# Patient Record
Sex: FEMALE | Race: WHITE | Employment: FULL TIME | URBAN - METROPOLITAN AREA
[De-identification: names, ages, dates, MRNs, and addresses within clinical notes are randomized per-mention and may not be internally consistent; named-entity substitution may affect disease eponyms.]

---

## 2017-01-19 DIAGNOSIS — Z98.84 S/P LAPAROSCOPIC SLEEVE GASTRECTOMY: ICD-10-CM

## 2017-01-19 RX ORDER — OMEPRAZOLE 40 MG/1
CAPSULE, DELAYED RELEASE ORAL
Qty: 30 CAPSULE | Refills: 0 | Status: SHIPPED | OUTPATIENT
Start: 2017-01-19 | End: 2017-02-20

## 2017-02-03 ENCOUNTER — OFFICE VISIT (OUTPATIENT)
Dept: BARIATRICS/WEIGHT MGMT | Age: 37
End: 2017-02-03

## 2017-02-03 VITALS
SYSTOLIC BLOOD PRESSURE: 110 MMHG | WEIGHT: 166.5 LBS | DIASTOLIC BLOOD PRESSURE: 62 MMHG | HEIGHT: 63 IN | BODY MASS INDEX: 29.5 KG/M2 | HEART RATE: 76 BPM

## 2017-02-03 DIAGNOSIS — Z98.84 S/P LAPAROSCOPIC SLEEVE GASTRECTOMY: ICD-10-CM

## 2017-02-03 DIAGNOSIS — E66.3 OVERWEIGHT (BMI 25.0-29.9): Primary | ICD-10-CM

## 2017-02-03 RX ORDER — FERROUS SULFATE 325(65) MG
28 TABLET ORAL
COMMUNITY

## 2017-02-03 RX ORDER — CETIRIZINE HYDROCHLORIDE 10 MG/1
10 TABLET ORAL 4 TIMES DAILY PRN
COMMUNITY

## 2017-02-03 ASSESSMENT — ENCOUNTER SYMPTOMS
GASTROINTESTINAL NEGATIVE: 1
RESPIRATORY NEGATIVE: 1
EYES NEGATIVE: 1

## 2017-02-20 RX ORDER — OMEPRAZOLE 40 MG/1
CAPSULE, DELAYED RELEASE ORAL
Qty: 30 CAPSULE | Refills: 0 | Status: SHIPPED | OUTPATIENT
Start: 2017-02-20 | End: 2017-03-16

## 2017-02-27 ENCOUNTER — TELEPHONE (OUTPATIENT)
Dept: BARIATRICS/WEIGHT MGMT | Age: 37
End: 2017-02-27

## 2017-03-16 DIAGNOSIS — Z98.84 S/P LAPAROSCOPIC SLEEVE GASTRECTOMY: ICD-10-CM

## 2017-03-16 RX ORDER — OMEPRAZOLE 40 MG/1
CAPSULE, DELAYED RELEASE ORAL
Qty: 30 CAPSULE | Refills: 0 | Status: SHIPPED | OUTPATIENT
Start: 2017-03-16 | End: 2017-05-15

## 2017-04-17 ENCOUNTER — TELEPHONE (OUTPATIENT)
Dept: BARIATRICS/WEIGHT MGMT | Age: 37
End: 2017-04-17

## 2017-05-14 DIAGNOSIS — Z98.84 S/P LAPAROSCOPIC SLEEVE GASTRECTOMY: ICD-10-CM

## 2017-05-15 RX ORDER — OMEPRAZOLE 40 MG/1
CAPSULE, DELAYED RELEASE ORAL
Qty: 30 CAPSULE | Refills: 0 | Status: SHIPPED | OUTPATIENT
Start: 2017-05-15 | End: 2017-06-10 | Stop reason: SDUPTHER

## 2017-05-16 ENCOUNTER — OFFICE VISIT (OUTPATIENT)
Dept: BARIATRICS/WEIGHT MGMT | Age: 37
End: 2017-05-16

## 2017-05-16 VITALS
HEIGHT: 63 IN | WEIGHT: 162.8 LBS | RESPIRATION RATE: 16 BRPM | SYSTOLIC BLOOD PRESSURE: 96 MMHG | BODY MASS INDEX: 28.84 KG/M2 | HEART RATE: 75 BPM | DIASTOLIC BLOOD PRESSURE: 61 MMHG

## 2017-05-16 DIAGNOSIS — E66.01 MORBID OBESITY WITH BMI OF 45.0-49.9, ADULT (HCC): Primary | ICD-10-CM

## 2017-05-16 DIAGNOSIS — Z98.84 S/P LAPAROSCOPIC SLEEVE GASTRECTOMY: ICD-10-CM

## 2017-05-16 PROCEDURE — 99213 OFFICE O/P EST LOW 20 MIN: CPT | Performed by: SURGERY

## 2017-06-10 DIAGNOSIS — Z98.84 S/P LAPAROSCOPIC SLEEVE GASTRECTOMY: ICD-10-CM

## 2017-06-12 RX ORDER — OMEPRAZOLE 40 MG/1
CAPSULE, DELAYED RELEASE ORAL
Qty: 30 CAPSULE | Refills: 0 | Status: SHIPPED | OUTPATIENT
Start: 2017-06-12 | End: 2017-07-06 | Stop reason: SDUPTHER

## 2017-07-06 DIAGNOSIS — Z98.84 S/P LAPAROSCOPIC SLEEVE GASTRECTOMY: ICD-10-CM

## 2017-07-06 RX ORDER — OMEPRAZOLE 40 MG/1
CAPSULE, DELAYED RELEASE ORAL
Qty: 30 CAPSULE | Refills: 0 | Status: SHIPPED | OUTPATIENT
Start: 2017-07-06 | End: 2017-08-06 | Stop reason: SDUPTHER

## 2017-08-06 DIAGNOSIS — Z98.84 S/P LAPAROSCOPIC SLEEVE GASTRECTOMY: ICD-10-CM

## 2017-08-07 RX ORDER — OMEPRAZOLE 40 MG/1
CAPSULE, DELAYED RELEASE ORAL
Qty: 30 CAPSULE | Refills: 0 | Status: SHIPPED | OUTPATIENT
Start: 2017-08-07 | End: 2017-09-04 | Stop reason: SDUPTHER

## 2017-11-13 ENCOUNTER — TELEPHONE (OUTPATIENT)
Dept: BARIATRICS/WEIGHT MGMT | Age: 37
End: 2017-11-13

## 2017-11-13 DIAGNOSIS — K21.9 CHRONIC GERD: ICD-10-CM

## 2017-11-13 RX ORDER — OMEPRAZOLE 40 MG/1
40 CAPSULE, DELAYED RELEASE ORAL DAILY
Qty: 30 CAPSULE | Refills: 3 | Status: SHIPPED | OUTPATIENT
Start: 2017-11-13 | End: 2019-10-03 | Stop reason: SDUPTHER

## 2017-11-13 NOTE — TELEPHONE ENCOUNTER
Please let patient know that I sent a prescription into the pharmacy listed in her chart. Thank you.

## 2018-09-07 LAB
CHOLESTEROL, TOTAL: 177 MG/DL
CHOLESTEROL/HDL RATIO: NORMAL
HDLC SERPL-MCNC: 58 MG/DL (ref 35–70)
LDL CHOLESTEROL CALCULATED: 94 MG/DL (ref 0–160)
TRIGL SERPL-MCNC: 125 MG/DL
VLDLC SERPL CALC-MCNC: 25 MG/DL

## 2018-09-21 ENCOUNTER — TELEPHONE (OUTPATIENT)
Dept: BARIATRICS/WEIGHT MGMT | Age: 38
End: 2018-09-21

## 2018-09-21 NOTE — LETTER
Jessica Albarran MD  [facility]) [employer]  [address], [address] [facility], [address] [facility]  [phone]  Phone  [phone]  Fax      Javier Santamaria,    We noticed that you missed your last appointment. We are interested in your progress and how you have been feeling! As you know, it is important to complete regular follow-up visits to monitor your health after surgery and to insure the best success of your procedure. Please call the office today at [phone] to schedule an appointment. We look forward to seeing you!     Jessica Albarran MD  [facility] ([facility]) [facility]  [address], [address] [facility], [address] [facility]  [phone]  Phone  [phone]  Fax

## 2019-02-04 LAB — TSH SERPL DL<=0.05 MIU/L-ACNC: 2.31 UIU/ML

## 2019-02-08 ENCOUNTER — HOSPITAL ENCOUNTER (OUTPATIENT)
Dept: ULTRASOUND IMAGING | Age: 39
Discharge: HOME | End: 2019-02-08
Payer: COMMERCIAL

## 2019-02-08 DIAGNOSIS — R22.1 NECK MASS: ICD-10-CM

## 2019-02-08 PROCEDURE — 76536 US EXAM OF HEAD AND NECK: CPT

## 2019-05-20 ENCOUNTER — TELEPHONE (OUTPATIENT)
Dept: FAMILY MEDICINE CLINIC | Age: 39
End: 2019-05-20

## 2019-05-20 NOTE — TELEPHONE ENCOUNTER
SPK W/ PT ADVISED SHE IS ON INJECTIONS FROM ALLERGIST NOW AND SHE HAS HAD NO HIVES. SHE WILL F/U WITH ALLERGIST AND GO FROM THERE. ADVISED JESSIKA ORNELAS AND SHE VOICED UNDERSTANDING.   Electronically signed by Raymond Mccall MA on 5/20/2019 at 3:15 PM

## 2019-05-20 NOTE — TELEPHONE ENCOUNTER
Got note from Dr. Bentley Apley (allergist), he recommended follow up to discuss referral to Rheumatology since nothing came back in his allergy tests. Can schedule with me or Dr. Meenu Driver.

## 2019-06-12 ENCOUNTER — TELEPHONE (OUTPATIENT)
Dept: FAMILY MEDICINE CLINIC | Age: 39
End: 2019-06-12

## 2019-06-20 ENCOUNTER — TELEPHONE (OUTPATIENT)
Dept: FAMILY MEDICINE CLINIC | Age: 39
End: 2019-06-20

## 2019-06-20 NOTE — TELEPHONE ENCOUNTER
----- Message from Titi Chambers sent at 6/20/2019  4:28 PM EDT -----  CALLED LAST WEEK TO GET HER IMMUNIZ[facility] RECORD FOR [facility]  THX

## 2019-06-25 ENCOUNTER — OFFICE VISIT (OUTPATIENT)
Dept: FAMILY MEDICINE CLINIC | Age: 39
End: 2019-06-25
Payer: COMMERCIAL

## 2019-06-25 VITALS
HEIGHT: 63 IN | DIASTOLIC BLOOD PRESSURE: 80 MMHG | SYSTOLIC BLOOD PRESSURE: 116 MMHG | WEIGHT: 211.4 LBS | BODY MASS INDEX: 37.46 KG/M2

## 2019-06-25 DIAGNOSIS — R39.9 UTI SYMPTOMS: Primary | ICD-10-CM

## 2019-06-25 LAB
BILIRUBIN, POC: ABNORMAL
BLOOD URINE, POC: ABNORMAL
CLARITY, POC: ABNORMAL
COLOR, POC: ABNORMAL
GLUCOSE URINE, POC: 100
KETONES, POC: 15
LEUKOCYTE EST, POC: ABNORMAL
NITRITE, POC: POSITIVE
PH, POC: 7.5
PROTEIN, POC: >300
SPECIFIC GRAVITY, POC: 1.01
UROBILINOGEN, POC: 1

## 2019-06-25 PROCEDURE — 99213 OFFICE O/P EST LOW 20 MIN: CPT | Performed by: NURSE PRACTITIONER

## 2019-06-25 PROCEDURE — 81002 URINALYSIS NONAUTO W/O SCOPE: CPT | Performed by: NURSE PRACTITIONER

## 2019-06-25 PROCEDURE — G8417 CALC BMI ABV UP PARAM F/U: HCPCS | Performed by: NURSE PRACTITIONER

## 2019-06-25 PROCEDURE — G8427 DOCREV CUR MEDS BY ELIG CLIN: HCPCS | Performed by: NURSE PRACTITIONER

## 2019-06-25 PROCEDURE — 1036F TOBACCO NON-USER: CPT | Performed by: NURSE PRACTITIONER

## 2019-06-25 RX ORDER — PHENAZOPYRIDINE HYDROCHLORIDE 200 MG/1
200 TABLET, FILM COATED ORAL 3 TIMES DAILY PRN
Qty: 6 TABLET | Refills: 0 | Status: SHIPPED | OUTPATIENT
Start: 2019-06-25 | End: 2019-06-28

## 2019-06-25 RX ORDER — SULFAMETHOXAZOLE AND TRIMETHOPRIM 800; 160 MG/1; MG/1
1 TABLET ORAL 2 TIMES DAILY
Qty: 6 TABLET | Refills: 0 | Status: SHIPPED | OUTPATIENT
Start: 2019-06-25 | End: 2019-06-28

## 2019-06-25 RX ORDER — PHENAZOPYRIDINE HYDROCHLORIDE 100 MG/1
100 TABLET, FILM COATED ORAL 3 TIMES DAILY PRN
COMMUNITY
End: 2019-06-25 | Stop reason: CLARIF

## 2019-06-25 NOTE — PROGRESS NOTES
Quan Nate  1980  41 y.o. SUBJECT JOSEY:    Chief Complaint   Patient presents with   Corinne Generous is a 41year old female who is in with UTI symptoms for the last week. She states she was treating self with OTC Azo and cystex (has some antibiotic effect). She states the treatments have not helped. She states the pain in her abdomen was so bad she doubled up on the Azo recommended treatment. She denies back pain, nausea, vomiting or seeing blood in her urine. She denies fevers, chills or sweats. Dysuria    This is a new problem. The current episode started in the past 7 days. The problem occurs every urination. The problem has been unchanged. The quality of the pain is described as aching. She is sexually active. Associated symptoms include frequency and urgency. Pertinent negatives include no chills, flank pain, hematuria or nausea. Current Outpatient Medications on File Prior to Visit   Medication Sig Dispense Refill    Methenamine-Sodium Salicylate (CYSTEX PO) Take by mouth      omeprazole (PRILOSEC) 40 MG delayed release capsule Take 1 capsule by mouth daily 30 capsule 3    cetirizine (ZYRTEC) 10 MG tablet Take 10 mg by mouth daily      ferrous sulfate 325 (65 FE) MG tablet Take 28 mg by mouth daily (with breakfast)      Calcium Citrate-Vitamin D (CALCIUM CITRATE+D3 PETITES) 200-250 MG-UNIT TABS Take 2 tablets by mouth daily      Multiple Vitamin (MULTI VITAMIN DAILY PO) Take 2 tablets by mouth daily      BIOTIN PO Take 1 tablet by mouth daily      fluticasone (FLONASE) 50 MCG/ACT nasal spray 1 spray by Nasal route 2 times daily      levothyroxine (SYNTHROID) 75 MCG tablet        No current facility-administered medications on file prior to visit.         Past Medical History:   Diagnosis Date    Gout     Hypothyroid     Morbid obesity with BMI of 45.0-49.9, adult [facility])      Past Surgical History:   Procedure Laterality Date    SLEEVE GASTRECTOMY  4/25/16    LAPAROSCOPIC SLEEVE GASTRECTOMY - ETHICON     Family History   Problem Relation Age of Onset    High Blood Pressure Father     High Cholesterol Father     Coronary Art Dis Father     Diabetes Father     Arthritis Father     Depression Mother     Depression Brother     Diabetes Paternal Uncle     Cancer Maternal Grandfather     Colon Cancer Neg Hx      Social History     Socioeconomic History    Marital status: Single     Spouse name: Not on file    Number of children: Not on file    Years of education: Not on file    Highest education level: Not on file   Occupational History    Not on file   Social Needs    Financial resource strain: Not on file    Food insecurity:     Worry: Not on file     Inability: Not on file    Transportation needs:     Medical: Not on file     Non-medical: Not on file   Tobacco Use    Smoking status: Never Smoker    Smokeless tobacco: Never Used   Substance and Sexual Activity    Alcohol use: No    Drug use: No    Sexual activity: Never   Lifestyle    Physical activity:     Days per week: Not on file     Minutes per session: Not on file    Stress: Not on file   Relationships    Social connections:     Talks on phone: Not on file     Gets together: Not on file     Attends [employer] service: Not on file     Active member of club or organization: Not on file     Attends meetings of clubs or organizations: Not on file     Relationship status: Not on file    Intimate partner violence:     Fear of current or ex partner: Not on file     Emotionally abused: Not on file     Physically abused: Not on file     Forced sexual activity: Not on file   Other Topics Concern    Not on file   Social History Narrative    Not on file       Review of Systems   Constitutional: Negative for activity change, appetite change, chills, diaphoresis, fatigue, fever and unexpected weight change. Respiratory: Negative for cough, chest tightness, shortness of breath and wheezing.     Cardiovascular: Negative for chest pain and palpitations. Gastrointestinal: Negative for nausea. Genitourinary: Positive for dysuria, frequency and urgency. Negative for decreased urine volume, difficulty urinating, flank pain and hematuria. Musculoskeletal: Negative for back pain. OBJECTIVE:     /80 (Site: Right Upper Arm, Position: Sitting, Cuff Size: Medium Adult)   Ht 5' 3\" (1.6 m)   Wt 211 lb 6.4 oz (95.9 kg)   BMI 37.45 kg/m²     Physical Exam   Constitutional: She is oriented to person, place, and time. She appears well-developed and well-nourished. No distress. HENT:   Head: Normocephalic and atraumatic. Eyes: Pupils are equal, round, and reactive to light. Conjunctivae are normal.   Neck: Normal range of motion. Neck supple. Cardiovascular: Normal rate, regular rhythm and normal heart sounds. Pulmonary/Chest: Effort normal and breath sounds normal.   Abdominal: Soft. Bowel sounds are normal. She exhibits no distension. There is tenderness in the suprapubic area. There is CVA tenderness. Musculoskeletal: Normal range of motion. Neurological: She is alert and oriented to person, place, and time. Skin: Skin is warm and dry. She is not diaphoretic. Psychiatric: She has a normal mood and affect. Her behavior is normal. Judgment and thought content normal.   Vitals reviewed. No results found for requested labs within last 30 days.      No results found for: Dewaine Rater, LDLCALC    Lab Results   Component Value Date    WBC 13.8 04/26/2016    WBC 8.4 03/15/2011    NEUTROABS 11.6 04/26/2016    HGB 13.1 04/26/2016    HGB 13.6 03/15/2011    HCT 39.9 04/26/2016    HCT 41.0 03/15/2011    MCV 89.5 04/26/2016    MCV 91.5 03/15/2011     04/26/2016     03/15/2011    LYMPHSABS 1.6 04/26/2016    MONOSABS 0.6 04/26/2016    EOSABS 0.0 04/26/2016    BASOSABS 0.0 04/26/2016     Lab Results   Component Value Date    TSHHS 3.413 03/15/2011     No results found for: LABALBU, BILITOT, BILIDIR, IBILI, AST, ALT, ALKPHOS          Results for orders placed or performed in visit on 06/25/19   POCT Urinalysis no Micro   Result Value Ref Range    Color, UA Orange     Clarity, UA Cloudy     Glucose, UA      Bilirubin, UA small     Ketones, UA 15     Spec Grav, UA 1.015     Blood, UA POC small     pH, UA 7.5     Protein, UA POC >300     Urobilinogen, UA 1.0     Leukocytes, UA small     Nitrite, UA positive        ASSESSMENT AND PLAN:     1. UTI symptoms  - POCT Urinalysis no Micro  - URINE CULTURE  - sulfamethoxazole-trimethoprim (BACTRIM DS;SEPTRA DS) 800-160 MG per tablet; Take 1 tablet by mouth 2 times daily for 3 days  Dispense: 6 tablet; Refill: 0  - phenazopyridine (PYRIDIUM) 200 MG tablet; Take 1 tablet by mouth 3 times daily as needed for Pain  Dispense: 6 tablet; Refill: 0    Increase fluids, rest  Take prescribed medications as directed  Will send urine for culture  Will call results to you  Return to clinic with any problems or contact your PCP  Verbalized understanding and agreement with plan    No follow-ups on file. Care discussed with patient. Patient educated on signs and symptoms of exacerbation and when to seek further medical attention. Advised to call for any problems, questions, or concerns. Patient verbalizes understanding and agrees with plan. Medications reviewed and reconciled. Continue current medications. Appropriate prescriptions are ordered. Risks and benefits of meds are discussed. After visit summary provided.

## 2019-06-26 ASSESSMENT — ENCOUNTER SYMPTOMS
CHEST TIGHTNESS: 0
SHORTNESS OF BREATH: 0
WHEEZING: 0
BACK PAIN: 0
NAUSEA: 0
COUGH: 0

## 2019-06-26 NOTE — PATIENT INSTRUCTIONS
Increase fluids, rest  Take prescribed medications as directed  Will send urine for culture  Will call results to you  Return to clinic with any problems or contact your PCP  Verbalized understanding and agreement with plan

## 2019-06-27 ENCOUNTER — TELEPHONE (OUTPATIENT)
Dept: FAMILY MEDICINE CLINIC | Age: 39
End: 2019-06-27

## 2019-06-27 LAB
ORGANISM: ABNORMAL
URINE CULTURE, ROUTINE: ABNORMAL
URINE CULTURE, ROUTINE: ABNORMAL

## 2019-06-27 NOTE — TELEPHONE ENCOUNTER
Notified Pt of Urine  results. Verbalized understanding. Pt feeling much better     ----- Message from ELIEL Jolly CNP sent at 6/27/2019  5:01 PM EDT -----  Please let patient know results of her urine culture showed a bacterial infection. The Bactrim is a drug that should be helping. Please let the patient know to call back if the medication is not helping. Thanks.   Mily Jaimes

## 2019-08-16 ENCOUNTER — NURSE ONLY (OUTPATIENT)
Dept: FAMILY MEDICINE CLINIC | Age: 39
End: 2019-08-16

## 2019-08-16 DIAGNOSIS — Z01.84 IMMUNITY TO VARICELLA DETERMINED BY SEROLOGIC TEST: Primary | ICD-10-CM

## 2019-08-16 NOTE — PROGRESS NOTES
Patient presented to office today for a varicella-zoster titer. One SST was obtained with a butterfly. Patient tolerated with out complaint.

## 2019-08-19 LAB — VZV IGG SER QL IA: 902 IV

## 2019-08-27 ENCOUNTER — OFFICE VISIT (OUTPATIENT)
Dept: FAMILY MEDICINE CLINIC | Age: 39
End: 2019-08-27
Payer: COMMERCIAL

## 2019-08-27 DIAGNOSIS — Z11.1 TUBERCULOSIS SCREENING: Primary | ICD-10-CM

## 2019-08-27 PROCEDURE — 86580 TB INTRADERMAL TEST: CPT | Performed by: NURSE PRACTITIONER

## 2019-10-03 ENCOUNTER — OFFICE VISIT (OUTPATIENT)
Dept: FAMILY MEDICINE CLINIC | Age: 39
End: 2019-10-03

## 2019-10-03 ENCOUNTER — TELEPHONE (OUTPATIENT)
Dept: BARIATRICS/WEIGHT MGMT | Age: 39
End: 2019-10-03

## 2019-10-03 VITALS
WEIGHT: 216.2 LBS | DIASTOLIC BLOOD PRESSURE: 82 MMHG | SYSTOLIC BLOOD PRESSURE: 130 MMHG | OXYGEN SATURATION: 97 % | BODY MASS INDEX: 38.3 KG/M2 | HEART RATE: 87 BPM

## 2019-10-03 DIAGNOSIS — Z82.49: Primary | ICD-10-CM

## 2019-10-03 DIAGNOSIS — R07.9 CHEST PAIN, UNSPECIFIED TYPE: ICD-10-CM

## 2019-10-03 DIAGNOSIS — K21.9 CHRONIC GERD: ICD-10-CM

## 2019-10-03 PROCEDURE — G8417 CALC BMI ABV UP PARAM F/U: HCPCS | Performed by: NURSE PRACTITIONER

## 2019-10-03 PROCEDURE — 93000 ELECTROCARDIOGRAM COMPLETE: CPT | Performed by: NURSE PRACTITIONER

## 2019-10-03 PROCEDURE — G8427 DOCREV CUR MEDS BY ELIG CLIN: HCPCS | Performed by: NURSE PRACTITIONER

## 2019-10-03 PROCEDURE — 1036F TOBACCO NON-USER: CPT | Performed by: NURSE PRACTITIONER

## 2019-10-03 PROCEDURE — G8484 FLU IMMUNIZE NO ADMIN: HCPCS | Performed by: NURSE PRACTITIONER

## 2019-10-03 PROCEDURE — 99213 OFFICE O/P EST LOW 20 MIN: CPT | Performed by: NURSE PRACTITIONER

## 2019-10-03 RX ORDER — OMEPRAZOLE 40 MG/1
40 CAPSULE, DELAYED RELEASE ORAL DAILY
Qty: 30 CAPSULE | Refills: 2 | Status: SHIPPED | OUTPATIENT
Start: 2019-10-03 | End: 2019-10-10 | Stop reason: SDUPTHER

## 2019-10-03 RX ORDER — ATORVASTATIN CALCIUM 40 MG/1
TABLET, FILM COATED ORAL
Refills: 1 | COMMUNITY
Start: 2019-07-23 | End: 2019-10-10 | Stop reason: SDUPTHER

## 2019-10-03 RX ORDER — OMALIZUMAB 150 MG/ML
INJECTION, SOLUTION SUBCUTANEOUS
COMMUNITY
Start: 2019-09-13 | End: 2021-10-19

## 2019-10-03 ASSESSMENT — ENCOUNTER SYMPTOMS
SORE THROAT: 1
WHEEZING: 0
NAUSEA: 0
SINUS PAIN: 0
TROUBLE SWALLOWING: 0
SHORTNESS OF BREATH: 0
CHEST TIGHTNESS: 0
SINUS PRESSURE: 0
BACK PAIN: 1
COUGH: 0
ABDOMINAL PAIN: 0

## 2019-10-03 ASSESSMENT — PATIENT HEALTH QUESTIONNAIRE - PHQ9
1. LITTLE INTEREST OR PLEASURE IN DOING THINGS: 0
SUM OF ALL RESPONSES TO PHQ QUESTIONS 1-9: 0
SUM OF ALL RESPONSES TO PHQ9 QUESTIONS 1 & 2: 0
SUM OF ALL RESPONSES TO PHQ QUESTIONS 1-9: 0
2. FEELING DOWN, DEPRESSED OR HOPELESS: 0

## 2019-10-07 DIAGNOSIS — F32.A DEPRESSIVE DISORDER: ICD-10-CM

## 2019-10-07 RX ORDER — NORGESTIMATE AND ETHINYL ESTRADIOL 0.25-0.035
1 KIT ORAL DAILY
COMMUNITY
End: 2019-10-10 | Stop reason: SDUPTHER

## 2019-10-10 ENCOUNTER — OFFICE VISIT (OUTPATIENT)
Dept: FAMILY MEDICINE CLINIC | Age: 39
End: 2019-10-10

## 2019-10-10 VITALS
WEIGHT: 216.8 LBS | BODY MASS INDEX: 38.4 KG/M2 | HEART RATE: 88 BPM | OXYGEN SATURATION: 98 % | DIASTOLIC BLOOD PRESSURE: 86 MMHG | SYSTOLIC BLOOD PRESSURE: 124 MMHG

## 2019-10-10 DIAGNOSIS — E03.8 OTHER SPECIFIED HYPOTHYROIDISM: ICD-10-CM

## 2019-10-10 DIAGNOSIS — Z76.0 MEDICATION REFILL: ICD-10-CM

## 2019-10-10 DIAGNOSIS — E78.5 HYPERLIPIDEMIA, UNSPECIFIED HYPERLIPIDEMIA TYPE: Primary | ICD-10-CM

## 2019-10-10 DIAGNOSIS — K21.9 CHRONIC GERD: ICD-10-CM

## 2019-10-10 DIAGNOSIS — T75.3XXA MOTION SICKNESS, INITIAL ENCOUNTER: ICD-10-CM

## 2019-10-10 DIAGNOSIS — Z76.89 ENCOUNTER TO ESTABLISH CARE: ICD-10-CM

## 2019-10-10 DIAGNOSIS — N92.6 MENSTRUAL CYCLE DISORDER: ICD-10-CM

## 2019-10-10 PROCEDURE — G8427 DOCREV CUR MEDS BY ELIG CLIN: HCPCS | Performed by: NURSE PRACTITIONER

## 2019-10-10 PROCEDURE — G8417 CALC BMI ABV UP PARAM F/U: HCPCS | Performed by: NURSE PRACTITIONER

## 2019-10-10 PROCEDURE — 99214 OFFICE O/P EST MOD 30 MIN: CPT | Performed by: NURSE PRACTITIONER

## 2019-10-10 PROCEDURE — G8484 FLU IMMUNIZE NO ADMIN: HCPCS | Performed by: NURSE PRACTITIONER

## 2019-10-10 PROCEDURE — 1036F TOBACCO NON-USER: CPT | Performed by: NURSE PRACTITIONER

## 2019-10-10 RX ORDER — SCOLOPAMINE TRANSDERMAL SYSTEM 1 MG/1
1 PATCH, EXTENDED RELEASE TRANSDERMAL
Qty: 1 PATCH | Refills: 0 | Status: SHIPPED | OUTPATIENT
Start: 2019-10-10 | End: 2022-05-23 | Stop reason: SDUPTHER

## 2019-10-10 RX ORDER — NORGESTIMATE AND ETHINYL ESTRADIOL 0.25-0.035
1 KIT ORAL DAILY
Qty: 3 PACKET | Refills: 3 | Status: SHIPPED | OUTPATIENT
Start: 2019-10-10

## 2019-10-10 RX ORDER — ATORVASTATIN CALCIUM 40 MG/1
TABLET, FILM COATED ORAL
Qty: 90 TABLET | Refills: 1 | Status: SHIPPED | OUTPATIENT
Start: 2019-10-10 | End: 2020-05-28 | Stop reason: SDUPTHER

## 2019-10-10 RX ORDER — OMEPRAZOLE 40 MG/1
40 CAPSULE, DELAYED RELEASE ORAL DAILY
Qty: 90 CAPSULE | Refills: 1 | Status: SHIPPED | OUTPATIENT
Start: 2019-10-10 | End: 2020-04-10

## 2019-10-10 RX ORDER — LEVOTHYROXINE SODIUM 0.07 MG/1
75 TABLET ORAL DAILY
Qty: 90 TABLET | Refills: 1 | Status: SHIPPED | OUTPATIENT
Start: 2019-10-10 | End: 2020-04-10

## 2019-10-10 ASSESSMENT — ENCOUNTER SYMPTOMS
CHEST TIGHTNESS: 0
SORE THROAT: 0
COUGH: 0
VOMITING: 0
SHORTNESS OF BREATH: 0
TROUBLE SWALLOWING: 0
CONSTIPATION: 0
ABDOMINAL PAIN: 0
DIARRHEA: 0
NAUSEA: 0
WHEEZING: 0

## 2019-10-14 ENCOUNTER — OFFICE VISIT (OUTPATIENT)
Dept: FAMILY MEDICINE CLINIC | Age: 39
End: 2019-10-14

## 2019-10-14 VITALS
DIASTOLIC BLOOD PRESSURE: 78 MMHG | BODY MASS INDEX: 38.88 KG/M2 | WEIGHT: 219.4 LBS | HEART RATE: 83 BPM | SYSTOLIC BLOOD PRESSURE: 124 MMHG | HEIGHT: 63 IN | TEMPERATURE: 97.9 F

## 2019-10-14 DIAGNOSIS — R39.9 UTI SYMPTOMS: Primary | ICD-10-CM

## 2019-10-14 LAB
BILIRUBIN, POC: NORMAL
BLOOD URINE, POC: NORMAL
CLARITY, POC: CLEAR
COLOR, POC: YELLOW
GLUCOSE URINE, POC: NORMAL
KETONES, POC: NORMAL
LEUKOCYTE EST, POC: NORMAL
NITRITE, POC: NORMAL
PH, POC: 7
PROTEIN, POC: NORMAL
SPECIFIC GRAVITY, POC: 1.02
UROBILINOGEN, POC: 0.2

## 2019-10-14 PROCEDURE — 1036F TOBACCO NON-USER: CPT | Performed by: NURSE PRACTITIONER

## 2019-10-14 PROCEDURE — G8484 FLU IMMUNIZE NO ADMIN: HCPCS | Performed by: NURSE PRACTITIONER

## 2019-10-14 PROCEDURE — 81002 URINALYSIS NONAUTO W/O SCOPE: CPT | Performed by: NURSE PRACTITIONER

## 2019-10-14 PROCEDURE — G8427 DOCREV CUR MEDS BY ELIG CLIN: HCPCS | Performed by: NURSE PRACTITIONER

## 2019-10-14 PROCEDURE — 99213 OFFICE O/P EST LOW 20 MIN: CPT | Performed by: NURSE PRACTITIONER

## 2019-10-14 PROCEDURE — G8417 CALC BMI ABV UP PARAM F/U: HCPCS | Performed by: NURSE PRACTITIONER

## 2019-10-14 RX ORDER — NITROFURANTOIN 25; 75 MG/1; MG/1
100 CAPSULE ORAL 2 TIMES DAILY
Qty: 14 CAPSULE | Refills: 0 | Status: SHIPPED | OUTPATIENT
Start: 2019-10-14 | End: 2019-10-21

## 2019-10-14 ASSESSMENT — ENCOUNTER SYMPTOMS
COUGH: 0
ABDOMINAL PAIN: 0
SHORTNESS OF BREATH: 0
NAUSEA: 0
WHEEZING: 0
CHEST TIGHTNESS: 0
BACK PAIN: 0

## 2019-10-16 LAB — URINE CULTURE, ROUTINE: NORMAL

## 2020-05-28 RX ORDER — ATORVASTATIN CALCIUM 40 MG/1
TABLET, FILM COATED ORAL
Qty: 90 TABLET | Refills: 0 | Status: SHIPPED | OUTPATIENT
Start: 2020-05-28 | End: 2020-08-13 | Stop reason: SDUPTHER

## 2020-07-27 ENCOUNTER — NURSE ONLY (OUTPATIENT)
Dept: FAMILY MEDICINE CLINIC | Age: 40
End: 2020-07-27
Payer: COMMERCIAL

## 2020-07-27 LAB
CHOLESTEROL, TOTAL: 186 MG/DL (ref 0–199)
HDLC SERPL-MCNC: 47 MG/DL (ref 40–60)
LDL CHOLESTEROL CALCULATED: 95 MG/DL
TRIGL SERPL-MCNC: 222 MG/DL (ref 0–150)
VLDLC SERPL CALC-MCNC: 44 MG/DL

## 2020-07-27 PROCEDURE — 36415 COLL VENOUS BLD VENIPUNCTURE: CPT | Performed by: NURSE PRACTITIONER

## 2020-07-27 RX ORDER — ALBUTEROL SULFATE 2.5 MG/3ML
2.5 SOLUTION RESPIRATORY (INHALATION) EVERY 6 HOURS PRN
Qty: 25 EACH | Refills: 0 | Status: SHIPPED | OUTPATIENT
Start: 2020-07-27 | End: 2020-07-30 | Stop reason: SDUPTHER

## 2020-07-28 ENCOUNTER — TELEPHONE (OUTPATIENT)
Dept: FAMILY MEDICINE CLINIC | Age: 40
End: 2020-07-28

## 2020-07-28 DIAGNOSIS — E78.5 HYPERLIPIDEMIA, UNSPECIFIED HYPERLIPIDEMIA TYPE: ICD-10-CM

## 2020-07-28 LAB
A/G RATIO: 1.5 (ref 1.1–2.2)
ALBUMIN SERPL-MCNC: 4.1 G/DL (ref 3.4–5)
ALP BLD-CCNC: 56 U/L (ref 40–129)
ALT SERPL-CCNC: 12 U/L (ref 10–40)
ANION GAP SERPL CALCULATED.3IONS-SCNC: 20 MMOL/L (ref 3–16)
AST SERPL-CCNC: 16 U/L (ref 15–37)
BILIRUB SERPL-MCNC: <0.2 MG/DL (ref 0–1)
BUN BLDV-MCNC: 13 MG/DL (ref 7–20)
CALCIUM SERPL-MCNC: 9.4 MG/DL (ref 8.3–10.6)
CHLORIDE BLD-SCNC: 100 MMOL/L (ref 99–110)
CO2: 20 MMOL/L (ref 21–32)
CREAT SERPL-MCNC: 0.8 MG/DL (ref 0.6–1.1)
GFR AFRICAN AMERICAN: >60
GFR NON-AFRICAN AMERICAN: >60
GLOBULIN: 2.8 G/DL
GLUCOSE BLD-MCNC: 80 MG/DL (ref 70–99)
POTASSIUM SERPL-SCNC: 4 MMOL/L (ref 3.5–5.1)
SODIUM BLD-SCNC: 140 MMOL/L (ref 136–145)
TOTAL PROTEIN: 6.9 G/DL (ref 6.4–8.2)

## 2020-07-28 NOTE — TELEPHONE ENCOUNTER
[address] with the CMP result. Discussed possible reason for elevation in triglycerides - hormonal. She states her gyn increased the dosage of her hormone about 2 months ago to see if this would help with increase in hot flashes and other symptoms she was having. She states her lifestyle has not been the best with the pandemic. States she is making some changes. Will repeat lipids in 4 to 6 months. Verbalized understanding and agreement with plan.

## 2020-07-28 NOTE — TELEPHONE ENCOUNTER
Called patient with lab result. She states she watches her diet but has not been walking or being physically active. She states she has not missed any of her statin. Will add CMP to evaluate other values.  Verbalized understanding and agreement with plan

## 2020-07-30 RX ORDER — ALBUTEROL SULFATE 2.5 MG/3ML
2.5 SOLUTION RESPIRATORY (INHALATION) EVERY 6 HOURS PRN
Qty: 25 EACH | Refills: 0 | Status: SHIPPED | OUTPATIENT
Start: 2020-07-30 | End: 2020-08-03 | Stop reason: SDUPTHER

## 2020-08-03 RX ORDER — ALBUTEROL SULFATE 2.5 MG/3ML
2.5 SOLUTION RESPIRATORY (INHALATION) EVERY 6 HOURS PRN
Qty: 25 EACH | Refills: 0 | Status: SHIPPED | OUTPATIENT
Start: 2020-08-03

## 2020-08-07 ENCOUNTER — NURSE ONLY (OUTPATIENT)
Dept: FAMILY MEDICINE CLINIC | Age: 40
End: 2020-08-07

## 2020-08-11 LAB
QUANTI TB GOLD PLUS: NEGATIVE
QUANTI TB1 MINUS NIL: 0 IU/ML (ref 0–0.34)
QUANTI TB2 MINUS NIL: 0 IU/ML (ref 0–0.34)
QUANTIFERON MITOGEN: >10 IU/ML
QUANTIFERON NIL: 0.02 IU/ML

## 2020-08-13 RX ORDER — ATORVASTATIN CALCIUM 40 MG/1
TABLET, FILM COATED ORAL
Qty: 90 TABLET | Refills: 1 | Status: SHIPPED | OUTPATIENT
Start: 2020-08-13 | End: 2021-02-16

## 2020-10-05 RX ORDER — OMEPRAZOLE 40 MG/1
CAPSULE, DELAYED RELEASE ORAL
Qty: 90 CAPSULE | Refills: 3 | OUTPATIENT
Start: 2020-10-05

## 2020-10-05 RX ORDER — LEVOTHYROXINE SODIUM 0.07 MG/1
TABLET ORAL
Qty: 90 TABLET | Refills: 3 | OUTPATIENT
Start: 2020-10-05

## 2020-10-29 ENCOUNTER — NURSE ONLY (OUTPATIENT)
Dept: FAMILY MEDICINE CLINIC | Age: 40
End: 2020-10-29
Payer: COMMERCIAL

## 2020-10-29 PROCEDURE — 90471 IMMUNIZATION ADMIN: CPT | Performed by: NURSE PRACTITIONER

## 2020-10-29 PROCEDURE — 90686 IIV4 VACC NO PRSV 0.5 ML IM: CPT | Performed by: NURSE PRACTITIONER

## 2020-11-03 ENCOUNTER — NURSE ONLY (OUTPATIENT)
Dept: FAMILY MEDICINE CLINIC | Age: 40
End: 2020-11-03
Payer: COMMERCIAL

## 2020-11-03 LAB
BASOPHILS ABSOLUTE: 0 K/UL (ref 0–0.2)
BASOPHILS RELATIVE PERCENT: 0.3 %
CHOLESTEROL, TOTAL: 171 MG/DL (ref 0–199)
EOSINOPHILS ABSOLUTE: 0.1 K/UL (ref 0–0.6)
EOSINOPHILS RELATIVE PERCENT: 1.3 %
HCT VFR BLD CALC: 39.5 % (ref 36–48)
HDLC SERPL-MCNC: 44 MG/DL (ref 40–60)
HEMOGLOBIN: 13.2 G/DL (ref 12–16)
LDL CHOLESTEROL CALCULATED: 85 MG/DL
LYMPHOCYTES ABSOLUTE: 2.2 K/UL (ref 1–5.1)
LYMPHOCYTES RELATIVE PERCENT: 30.7 %
MCH RBC QN AUTO: 30.6 PG (ref 26–34)
MCHC RBC AUTO-ENTMCNC: 33.4 G/DL (ref 31–36)
MCV RBC AUTO: 91.8 FL (ref 80–100)
MONOCYTES ABSOLUTE: 0.6 K/UL (ref 0–1.3)
MONOCYTES RELATIVE PERCENT: 7.7 %
NEUTROPHILS ABSOLUTE: 4.4 K/UL (ref 1.7–7.7)
NEUTROPHILS RELATIVE PERCENT: 60 %
PDW BLD-RTO: 12.6 % (ref 12.4–15.4)
PLATELET # BLD: 405 K/UL (ref 135–450)
PMV BLD AUTO: 7.3 FL (ref 5–10.5)
RBC # BLD: 4.31 M/UL (ref 4–5.2)
T4 FREE: 1 NG/DL (ref 0.9–1.8)
TRIGL SERPL-MCNC: 212 MG/DL (ref 0–150)
TSH SERPL DL<=0.05 MIU/L-ACNC: 2.83 UIU/ML (ref 0.27–4.2)
VLDLC SERPL CALC-MCNC: 42 MG/DL
WBC # BLD: 7.3 K/UL (ref 4–11)

## 2020-11-03 PROCEDURE — 36415 COLL VENOUS BLD VENIPUNCTURE: CPT | Performed by: NURSE PRACTITIONER

## 2020-11-05 RX ORDER — OMEPRAZOLE 40 MG/1
CAPSULE, DELAYED RELEASE ORAL
Qty: 90 CAPSULE | Refills: 1 | Status: SHIPPED | OUTPATIENT
Start: 2020-11-05 | End: 2021-03-01

## 2021-01-08 DIAGNOSIS — J45.20 MILD INTERMITTENT ASTHMA WITHOUT COMPLICATION: ICD-10-CM

## 2021-01-08 RX ORDER — ALBUTEROL SULFATE 90 UG/1
2 AEROSOL, METERED RESPIRATORY (INHALATION) 4 TIMES DAILY PRN
Qty: 1 INHALER | Refills: 1 | Status: SHIPPED | OUTPATIENT
Start: 2021-01-08

## 2021-01-29 ENCOUNTER — VIRTUAL VISIT (OUTPATIENT)
Dept: FAMILY MEDICINE CLINIC | Age: 41
End: 2021-01-29
Payer: COMMERCIAL

## 2021-01-29 DIAGNOSIS — J02.9 ACUTE VIRAL PHARYNGITIS: Primary | ICD-10-CM

## 2021-01-29 DIAGNOSIS — Z20.818 EXPOSURE TO STREP THROAT: ICD-10-CM

## 2021-01-29 PROCEDURE — 1036F TOBACCO NON-USER: CPT | Performed by: NURSE PRACTITIONER

## 2021-01-29 PROCEDURE — 99213 OFFICE O/P EST LOW 20 MIN: CPT | Performed by: NURSE PRACTITIONER

## 2021-01-29 PROCEDURE — G8421 BMI NOT CALCULATED: HCPCS | Performed by: NURSE PRACTITIONER

## 2021-01-29 PROCEDURE — G8482 FLU IMMUNIZE ORDER/ADMIN: HCPCS | Performed by: NURSE PRACTITIONER

## 2021-01-29 PROCEDURE — G8427 DOCREV CUR MEDS BY ELIG CLIN: HCPCS | Performed by: NURSE PRACTITIONER

## 2021-01-29 RX ORDER — AZITHROMYCIN 250 MG/1
250 TABLET, FILM COATED ORAL SEE ADMIN INSTRUCTIONS
Qty: 6 TABLET | Refills: 0 | Status: SHIPPED | OUTPATIENT
Start: 2021-01-29 | End: 2021-02-03

## 2021-02-01 ASSESSMENT — ENCOUNTER SYMPTOMS
SORE THROAT: 1
ABDOMINAL PAIN: 0
WHEEZING: 0
SINUS PAIN: 0
SHORTNESS OF BREATH: 0
CHEST TIGHTNESS: 0
COUGH: 0
SINUS PRESSURE: 0
NAUSEA: 0
TROUBLE SWALLOWING: 1

## 2021-02-01 ASSESSMENT — PATIENT HEALTH QUESTIONNAIRE - PHQ9
SUM OF ALL RESPONSES TO PHQ QUESTIONS 1-9: 0
SUM OF ALL RESPONSES TO PHQ QUESTIONS 1-9: 0
SUM OF ALL RESPONSES TO PHQ9 QUESTIONS 1 & 2: 0

## 2021-02-16 DIAGNOSIS — E78.5 HYPERLIPIDEMIA, UNSPECIFIED HYPERLIPIDEMIA TYPE: ICD-10-CM

## 2021-02-16 DIAGNOSIS — Z76.0 MEDICATION REFILL: ICD-10-CM

## 2021-02-16 RX ORDER — ATORVASTATIN CALCIUM 40 MG/1
TABLET, FILM COATED ORAL
Qty: 90 TABLET | Refills: 3 | Status: SHIPPED | OUTPATIENT
Start: 2021-02-16 | End: 2021-08-13 | Stop reason: SDUPTHER

## 2021-02-27 DIAGNOSIS — Z76.0 MEDICATION REFILL: ICD-10-CM

## 2021-02-27 DIAGNOSIS — K21.9 CHRONIC GERD: ICD-10-CM

## 2021-03-01 RX ORDER — OMEPRAZOLE 40 MG/1
CAPSULE, DELAYED RELEASE ORAL
Qty: 90 CAPSULE | Refills: 3 | Status: SHIPPED | OUTPATIENT
Start: 2021-03-01 | End: 2021-08-13 | Stop reason: SDUPTHER

## 2021-07-20 ENCOUNTER — NURSE ONLY (OUTPATIENT)
Dept: FAMILY MEDICINE CLINIC | Age: 41
End: 2021-07-20

## 2021-07-20 DIAGNOSIS — Z11.1 TUBERCULOSIS SCREENING: Primary | ICD-10-CM

## 2021-07-23 LAB
REASON FOR REJECTION: NORMAL
REJECTED TEST: NORMAL

## 2021-08-13 DIAGNOSIS — E03.8 OTHER SPECIFIED HYPOTHYROIDISM: ICD-10-CM

## 2021-08-13 DIAGNOSIS — Z76.0 MEDICATION REFILL: ICD-10-CM

## 2021-08-13 DIAGNOSIS — K21.9 CHRONIC GERD: ICD-10-CM

## 2021-08-13 DIAGNOSIS — E78.5 HYPERLIPIDEMIA, UNSPECIFIED HYPERLIPIDEMIA TYPE: ICD-10-CM

## 2021-08-13 RX ORDER — ATORVASTATIN CALCIUM 40 MG/1
TABLET, FILM COATED ORAL
Qty: 90 TABLET | Refills: 1 | Status: SHIPPED | OUTPATIENT
Start: 2021-08-13 | End: 2021-12-22

## 2021-08-13 RX ORDER — LEVOTHYROXINE SODIUM 0.07 MG/1
TABLET ORAL
Qty: 90 TABLET | Refills: 1 | Status: SHIPPED | OUTPATIENT
Start: 2021-08-13 | End: 2021-09-23 | Stop reason: SDUPTHER

## 2021-08-13 RX ORDER — OMEPRAZOLE 40 MG/1
40 CAPSULE, DELAYED RELEASE ORAL DAILY
Qty: 90 CAPSULE | Refills: 1 | Status: SHIPPED | OUTPATIENT
Start: 2021-08-13 | End: 2021-12-22

## 2021-08-13 NOTE — TELEPHONE ENCOUNTER
Fax request for 90 day mail order for Omeprazole 40mg cap and Atorvastatin Calcium 40 mg tab from [facility].

## 2021-08-17 ENCOUNTER — NURSE ONLY (OUTPATIENT)
Dept: FAMILY MEDICINE CLINIC | Age: 41
End: 2021-08-17
Payer: COMMERCIAL

## 2021-08-17 DIAGNOSIS — Z11.1 TUBERCULOSIS SCREENING: Primary | ICD-10-CM

## 2021-08-17 PROCEDURE — 86580 TB INTRADERMAL TEST: CPT | Performed by: NURSE PRACTITIONER

## 2021-08-20 ENCOUNTER — OFFICE VISIT (OUTPATIENT)
Dept: FAMILY MEDICINE CLINIC | Age: 41
End: 2021-08-20

## 2021-08-20 DIAGNOSIS — Z11.1 ENCOUNTER FOR PPD SKIN TEST READING: Primary | ICD-10-CM

## 2021-08-20 LAB
INDURATION: NORMAL
TB SKIN TEST: NORMAL

## 2021-08-20 PROCEDURE — 99999 PR OFFICE/OUTPT VISIT,PROCEDURE ONLY: CPT | Performed by: NURSE PRACTITIONER

## 2021-08-30 ENCOUNTER — NURSE TRIAGE (OUTPATIENT)
Dept: OTHER | Facility: CLINIC | Age: 41
End: 2021-08-30

## 2021-08-30 NOTE — TELEPHONE ENCOUNTER
Received call from Alanna Engel at [facility] with [facility]. Brief description of triage: cough    Triage indicates for patient to go to ER now. Patient refused office is closed no second level triage available at time of call. States does not want to go to ER and have a long wait time. Care advice provided, patient verbalizes understanding; denies any other questions or concerns; instructed to call back for any new or worsening symptoms. Writer provided warm transfer to [facility] at [facility] for appointment scheduling. per patient request sent to scheduling    Attention Provider: Thank you for allowing me to participate in the care of your patient. The patient was connected to triage in response to information provided to the [facility]. Please do not respond through this encounter as the response is not directed to a shared pool. Reason for Disposition   Difficulty breathing    Answer Assessment - Initial Assessment Questions  1. ONSET: \"When did the cough begin? \"       2 weeks ago    2. SEVERITY: \"How bad is the cough today? \"     Keeps her up at night    3. RESPIRATORY DISTRESS: \"Describe your breathing. \"     Wheezing    4. FEVER: \"Do you have a fever? \" If so, ask: \"What is your temperature, how was it measured, and when did it start? \"    No    5. SPUTUM: \"Describe the color of your sputum\" (clear, white, yellow, green)   yellow/ brown    6. HEMOPTYSIS: \"Are you coughing up any blood? \" If so ask: \"How much? \" (flecks, streaks, tablespoons, etc.)  No    7. CARDIAC HISTORY: \"Do you have any history of heart disease? \" (e.g., heart attack, congestive heart failure)   No    8. LUNG HISTORY: \"Do you have any history of lung disease? \"  (e.g., pulmonary embolus, asthma, emphysema)    Asthma    9. PE RISK FACTORS: \"Do you have a history of blood clots? \" (or: recent major surgery, recent prolonged travel, bedridden)      No    10. OTHER SYMPTOMS: \"Do you have any other symptoms? \" (e.g., runny nose, wheezing, chest pain)  Wheezing , runny nose, chest pressure      11. PREGNANCY: \"Is there any chance you are pregnant? \" \"When was your last menstrual period? \"    Mo    12. TRAVEL: \"Have you traveled out of the country in the last month? \" (e.g., travel history, exposures)    No    Protocols used: COUGH - ACUTE PRODUCTIVE-ADULT-AH

## 2021-08-31 ENCOUNTER — VIRTUAL VISIT (OUTPATIENT)
Dept: FAMILY MEDICINE CLINIC | Age: 41
End: 2021-08-31
Payer: COMMERCIAL

## 2021-08-31 DIAGNOSIS — J01.40 ACUTE NON-RECURRENT PANSINUSITIS: Primary | ICD-10-CM

## 2021-08-31 PROCEDURE — 1036F TOBACCO NON-USER: CPT | Performed by: NURSE PRACTITIONER

## 2021-08-31 PROCEDURE — 99213 OFFICE O/P EST LOW 20 MIN: CPT | Performed by: NURSE PRACTITIONER

## 2021-08-31 PROCEDURE — G8428 CUR MEDS NOT DOCUMENT: HCPCS | Performed by: NURSE PRACTITIONER

## 2021-08-31 PROCEDURE — G8421 BMI NOT CALCULATED: HCPCS | Performed by: NURSE PRACTITIONER

## 2021-08-31 RX ORDER — AZITHROMYCIN 250 MG/1
250 TABLET, FILM COATED ORAL SEE ADMIN INSTRUCTIONS
Qty: 6 TABLET | Refills: 0 | Status: SHIPPED | OUTPATIENT
Start: 2021-08-31 | End: 2021-09-05

## 2021-08-31 NOTE — TELEPHONE ENCOUNTER
Had virtual visit with patient today. Patient still does not want to be tested for Covid. Patient reported symptoms improving, now having an ear ache. Treatment provided.

## 2021-08-31 NOTE — PROGRESS NOTES
2021    TELEHEALTH EVALUATION -- Audio/Visual (During YLLAO-01 public health emergency)    HPI:    Galina Moody (:  1980) has requested an audio/video evaluation for the following concern(s):    Esperanza Marshall is a 45year old female who has requested a virtual visit for complaint of cough, shortness of breath, sore throat, head congestion, and muscle aches for 4 days. She denies fevers, chills or sweats. She states one day ago she started having left ear pain and some facial pain over the forehead and cheeks. She states she took Tylenol Cold and Sinus and Vicks when her symptoms first began. She states \"I get this every year because of my allergies\". She states the beginning symptoms are better today but the ear and face pain continues. Review of Systems   Constitutional: Negative for activity change, appetite change, chills, diaphoresis, fatigue, fever and unexpected weight change. HENT: Positive for congestion, ear pain, facial swelling, postnasal drip, sinus pressure, sinus pain and sore throat. Negative for ear discharge, rhinorrhea, sneezing and trouble swallowing. Respiratory: Positive for cough and shortness of breath. Negative for chest tightness and wheezing. Cardiovascular: Negative for chest pain and palpitations. Gastrointestinal: Negative for nausea. Musculoskeletal: Positive for myalgias. Neurological: Positive for headaches. Negative for dizziness and light-headedness. Hematological: Negative for adenopathy. Prior to Visit Medications    Medication Sig Taking?  Authorizing Provider   azithromycin (ZITHROMAX) 250 MG tablet Take 1 tablet by mouth See Admin Instructions for 5 days 500mg on day 1 followed by 250mg on days 2 - 5 Yes Madalyn Angelucci, APRN - CNP   omeprazole (PRILOSEC) 40 MG delayed release capsule Take 1 capsule by mouth daily  Madalyn Angelucci, APRN - CNP   atorvastatin (LIPITOR) 40 MG tablet TAKE 1 TABLET DAILY  Madalyn Angelucci, APRN - CNP   levothyroxine (SYNTHROID) 75 MCG tablet TAKE 1 TABLET DAILY  Samuelhie MinshaniceELIEL CNP   albuterol sulfate HFA (VENTOLIN HFA) 108 (90 Base) MCG/ACT inhaler Inhale 2 puffs into the lungs 4 times daily as needed for Wheezing  Rushie MinshaniceELIEL CNP   albuterol (PROVENTIL) (2.5 MG/3ML) 0.083% nebulizer solution Take 3 mLs by nebulization every 6 hours as needed for Wheezing  Rushie MinshaniceELIEL CNP   norgestimate-ethinyl estradiol (ESTARYLLA) 0.25-35 MG-MCG per tablet Take 1 tablet by mouth daily  Rushie MinELIEL prasad CNP   Terry Cooler 150 MG/ML SOSY injection   Historical Provider, MD   cetirizine (ZYRTEC) 10 MG tablet Take 10 mg by mouth 4 times daily as needed   Historical Provider, MD   ferrous sulfate 325 (65 FE) MG tablet Take 28 mg by mouth daily (with breakfast)  Historical Provider, MD   Calcium Citrate-Vitamin D (CALCIUM CITRATE+D3 PETITES) 200-250 MG-UNIT TABS Take 2 tablets by mouth daily  Historical Provider, MD   Multiple Vitamin (MULTI VITAMIN DAILY PO) Take 2 tablets by mouth daily  Historical Provider, MD   BIOTIN PO Take 1 tablet by mouth daily  Historical Provider, MD   fluticasone (FLONASE) 50 MCG/ACT nasal spray 1 spray by Nasal route 2 times daily  Historical Provider, MD       Social History     Tobacco Use    Smoking status: Never Smoker    Smokeless tobacco: Never Used   Substance Use Topics    Alcohol use: No    Drug use: No        PHYSICAL EXAMINATION:  [ INSTRUCTIONS:  \"[x]\" Indicates a positive item  \"[]\" Indicates a negative item  -- DELETE ALL ITEMS NOT EXAMINED]  Vital Signs: (As obtained by patient/caregiver or practitioner observation)    Blood pressure-  Heart rate-    Respiratory rate-    Temperature-  Pulse oximetry-     Constitutional: [x] Appears well-developed and well-nourished [x] No apparent distress      [] Abnormal-   Mental status  [x] Alert and awake  [x] Oriented to person/place/time [x]Able to follow commands      Eyes:  EOM    []  Normal  [] Abnormal-  Sclera  [x]  Normal  [] Abnormal -         Discharge [x]  None visible  [] Abnormal -    HENT:   [x] Normocephalic, atraumatic. [] Abnormal   [] Mouth/Throat: Mucous membranes are moist.     External Ears [x] Normal  [] Abnormal-     Neck: [x] No visualized mass     Pulmonary/Chest: [x] Respiratory effort normal.  [x] No visualized signs of difficulty breathing or respiratory distress        [] Abnormal-      Musculoskeletal:   [] Normal gait with no signs of ataxia         [x] Normal range of motion of neck        [] Abnormal-       Neurological:        [x] No Facial Asymmetry (Cranial nerve 7 motor function) (limited exam to video visit)          [x] No gaze palsy        [] Abnormal-         Skin:        [x] No significant exanthematous lesions or discoloration noted on facial skin         [] Abnormal-            Psychiatric:       [x] Normal Affect [] No Hallucinations        [] Abnormal-     Other pertinent observable physical exam findings-     ASSESSMENT/PLAN:  1. 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 Cherylene Merle, was evaluated through a synchronous (real-time) audio-video encounter. The patient (or guardian if applicable) is aware that this is a billable service. Verbal consent to proceed has been obtained within the past 12 months. The visit was conducted pursuant to the emergency declaration under the [address] [place] [address], [address] [place] [address] authority and the [employer] General Act. Patient identification was verified, and a caregiver was present when appropriate.  The patient was located in a state where the provider was credentialed to provide care. Total time spent on this encounter: Not billed by time    --ELIEL Deleon CNP on 8/31/2021 at 11:31 AM    An electronic signature was used to authenticate this note.

## 2021-09-01 ASSESSMENT — ENCOUNTER SYMPTOMS
NAUSEA: 0
RHINORRHEA: 0
TROUBLE SWALLOWING: 0
SORE THROAT: 1
WHEEZING: 0
SINUS PRESSURE: 1
FACIAL SWELLING: 1
SINUS PAIN: 1
SHORTNESS OF BREATH: 1
CHEST TIGHTNESS: 0
COUGH: 1

## 2021-09-23 ENCOUNTER — TELEPHONE (OUTPATIENT)
Dept: FAMILY MEDICINE CLINIC | Age: 41
End: 2021-09-23

## 2021-09-23 DIAGNOSIS — Z76.0 MEDICATION REFILL: ICD-10-CM

## 2021-09-23 DIAGNOSIS — E03.8 OTHER SPECIFIED HYPOTHYROIDISM: ICD-10-CM

## 2021-09-23 RX ORDER — LEVOTHYROXINE SODIUM 0.07 MG/1
TABLET ORAL
Qty: 90 TABLET | Refills: 1 | Status: SHIPPED | OUTPATIENT
Start: 2021-09-23 | End: 2021-12-22

## 2021-10-19 ENCOUNTER — OFFICE VISIT (OUTPATIENT)
Dept: FAMILY MEDICINE CLINIC | Age: 41
End: 2021-10-19
Payer: COMMERCIAL

## 2021-10-19 VITALS
TEMPERATURE: 97.7 F | BODY MASS INDEX: 39.58 KG/M2 | WEIGHT: 223.4 LBS | SYSTOLIC BLOOD PRESSURE: 122 MMHG | HEIGHT: 63 IN | OXYGEN SATURATION: 98 % | HEART RATE: 81 BPM | DIASTOLIC BLOOD PRESSURE: 80 MMHG

## 2021-10-19 DIAGNOSIS — Z79.2 NEED FOR PROPHYLACTIC ANTIBIOTIC: ICD-10-CM

## 2021-10-19 DIAGNOSIS — H65.92 MIDDLE EAR EFFUSION, LEFT: Primary | ICD-10-CM

## 2021-10-19 PROCEDURE — G8484 FLU IMMUNIZE NO ADMIN: HCPCS | Performed by: NURSE PRACTITIONER

## 2021-10-19 PROCEDURE — G8417 CALC BMI ABV UP PARAM F/U: HCPCS | Performed by: NURSE PRACTITIONER

## 2021-10-19 PROCEDURE — 1036F TOBACCO NON-USER: CPT | Performed by: NURSE PRACTITIONER

## 2021-10-19 PROCEDURE — 99213 OFFICE O/P EST LOW 20 MIN: CPT | Performed by: NURSE PRACTITIONER

## 2021-10-19 PROCEDURE — G8427 DOCREV CUR MEDS BY ELIG CLIN: HCPCS | Performed by: NURSE PRACTITIONER

## 2021-10-19 RX ORDER — ESCITALOPRAM OXALATE 10 MG/1
1.5 TABLET ORAL DAILY
COMMUNITY
Start: 2021-10-08 | End: 2022-07-05 | Stop reason: SDUPTHER

## 2021-10-19 RX ORDER — GREEN TEA/HOODIA GORDONII 315-12.5MG
1 CAPSULE ORAL 2 TIMES DAILY
Qty: 60 TABLET | Refills: 0 | Status: SHIPPED | OUTPATIENT
Start: 2021-10-19 | End: 2021-11-18

## 2021-10-19 RX ORDER — AZITHROMYCIN 250 MG/1
250 TABLET, FILM COATED ORAL SEE ADMIN INSTRUCTIONS
Qty: 6 TABLET | Refills: 0 | Status: SHIPPED | OUTPATIENT
Start: 2021-10-19 | End: 2021-10-24

## 2021-10-19 RX ORDER — TIZANIDINE 2 MG/1
1 TABLET ORAL NIGHTLY
COMMUNITY
Start: 2021-08-16

## 2021-10-19 NOTE — PROGRESS NOTES
Lauri Kat  1980  45 y.o. SUBJECT JOSEY:    Chief Complaint   Patient presents with    Otalgia     intermittent for the last couple of weeks, has taken tylenol and zyrtec       HPI    Rubén Samano is a 45year old female who is in with two week complaint of left ear pain. She states she had a URI about 6 weeks ago, got better but some symptoms returned. She states she had been using Tylenol and Zyrtec with no relief. She states she seems to get this every year around this time. She denies headache, sore throat, cough or shortness of breath.      Current Outpatient Medications on File Prior to Visit   Medication Sig Dispense Refill    escitalopram (LEXAPRO) 10 MG tablet Take 1.5 tablets by mouth daily      tiZANidine (ZANAFLEX) 2 MG tablet Take 1 tablet by mouth nightly      levothyroxine (SYNTHROID) 75 MCG tablet TAKE 1 TABLET DAILY 90 tablet 1    omeprazole (PRILOSEC) 40 MG delayed release capsule Take 1 capsule by mouth daily 90 capsule 1    atorvastatin (LIPITOR) 40 MG tablet TAKE 1 TABLET DAILY 90 tablet 1    albuterol sulfate HFA (VENTOLIN HFA) 108 (90 Base) MCG/ACT inhaler Inhale 2 puffs into the lungs 4 times daily as needed for Wheezing 1 Inhaler 1    albuterol (PROVENTIL) (2.5 MG/3ML) 0.083% nebulizer solution Take 3 mLs by nebulization every 6 hours as needed for Wheezing 25 each 0    norgestimate-ethinyl estradiol (ESTARYLLA) 0.25-35 MG-MCG per tablet Take 1 tablet by mouth daily 3 packet 3    cetirizine (ZYRTEC) 10 MG tablet Take 10 mg by mouth 4 times daily as needed       ferrous sulfate 325 (65 FE) MG tablet Take 28 mg by mouth daily (with breakfast)      Calcium Citrate-Vitamin D (CALCIUM CITRATE+D3 PETITES) 200-250 MG-UNIT TABS Take 2 tablets by mouth daily      Multiple Vitamin (MULTI VITAMIN DAILY PO) Take 2 tablets by mouth daily      fluticasone (FLONASE) 50 MCG/ACT nasal spray 1 spray by Nasal route 2 times daily      BIOTIN PO Take 1 tablet by mouth daily (Patient not taking: Reported on 10/19/2021)       No current facility-administered medications on file prior to visit. Past Medical History:   Diagnosis Date    Acne     Allergic rhinitis     Asthma     Depressive disorder     Gout     Hypothyroid     Morbid obesity with BMI of 45.0-49.9, adult [facility])      Past Surgical History:   Procedure Laterality Date    LASIK  2007    dr hood    SLEEVE GASTRECTOMY  4/25/16    LAPAROSCOPIC SLEEVE GASTRECTOMY - ETHICON    WISDOM TOOTH EXTRACTION  2000     Family History   Problem Relation Age of Onset    High Blood Pressure Father     High Cholesterol Father     Coronary Art Dis Father     Diabetes Father     Arthritis Father     Heart Attack Father         at age 46   Rola Amezcua Depression Mother     Asthma Mother     Allergies Mother     Depression Brother     Diabetes Paternal Uncle     Cancer Maternal Grandfather     Uterine Cancer Paternal Grandmother     Diabetes Other     Colon Cancer Neg Hx     Allergic Rhinitis Neg Hx      Social History     Socioeconomic History    Marital status: Single     Spouse name: Not on file    Number of children: Not on file    Years of education: Not on file    Highest education level: Not on file   Occupational History    Occupation: rehab nurse    Tobacco Use    Smoking status: Never Smoker    Smokeless tobacco: Never Used   Substance and Sexual Activity    Alcohol use: No    Drug use: No    Sexual activity: Never   Other Topics Concern    Not on file   Social History Narrative    Not on file     Social Determinants of Health     Financial Resource Strain:     Difficulty of Paying Living Expenses:    Food Insecurity:     Worried About Running Out of Food in the Last Year:     [address] in the Last Year:    Transportation Needs:     Lack of Transportation (Medical):      Lack of Transportation (Non-Medical):    Physical Activity:     Days of Exercise per Week:     Minutes of Exercise per Session:    Stress:     Feeling of Stress :    Social Connections:     Frequency of Communication with Friends and Family:     Frequency of Social Gatherings with Friends and Family:     Attends Hoahaoism Services:     Active Member of Clubs or Organizations:     Attends Club or Organization Meetings:     Marital Status:    Intimate Partner Violence:     Fear of Current or Ex-Partner:     Emotionally Abused:     Physically Abused:     Sexually Abused:        Review of Systems   Constitutional: Negative for activity change, appetite change, chills, diaphoresis, fatigue, fever and unexpected weight change. HENT: Positive for ear pain. Negative for congestion, ear discharge, facial swelling, hearing loss, rhinorrhea, sinus pressure, sinus pain, sore throat and trouble swallowing. Respiratory: Negative for cough, choking, chest tightness, shortness of breath and wheezing. Cardiovascular: Negative for chest pain and palpitations. Gastrointestinal: Negative. Genitourinary: Negative. Neurological: Negative for dizziness, light-headedness and headaches. Psychiatric/Behavioral: Positive for dysphoric mood. OBJECTIVE:     /80   Pulse 81   Temp 97.7 °F (36.5 °C) (Infrared)   Ht 5' 3\" (1.6 m)   Wt 223 lb 6.4 oz (101.3 kg)   LMP 10/10/2021 (Exact Date)   SpO2 98%   Breastfeeding No   BMI 39.57 kg/m²     Physical Exam  Vitals reviewed. Constitutional:       General: She is not in acute distress. Appearance: Normal appearance. She is well-developed. She is obese. She is not ill-appearing or diaphoretic. HENT:      Head: Normocephalic and atraumatic. Right Ear: Tympanic membrane, ear canal and external ear normal. No middle ear effusion. There is no impacted cerumen. No mastoid tenderness. Left Ear: External ear normal. A middle ear effusion is present. No mastoid tenderness. Nose: Nose normal. No congestion or rhinorrhea.       Mouth/Throat:      Mouth: Mucous membranes are moist. Eyes:      General: No scleral icterus. Conjunctiva/sclera: Conjunctivae normal.      Pupils: Pupils are equal, round, and reactive to light. Cardiovascular:      Rate and Rhythm: Normal rate and regular rhythm. Heart sounds: Normal heart sounds. No murmur heard. No friction rub. No gallop. Pulmonary:      Effort: Pulmonary effort is normal. No respiratory distress. Breath sounds: Normal breath sounds. No wheezing. Chest:      Chest wall: No tenderness. Musculoskeletal:         General: Normal range of motion. Cervical back: Normal range of motion and neck supple. No rigidity or tenderness. Lymphadenopathy:      Cervical: No cervical adenopathy. Skin:     General: Skin is warm and dry. Neurological:      Mental Status: She is alert and oriented to person, place, and time. Psychiatric:         Behavior: Behavior normal.         Thought Content: Thought content normal.         Judgment: Judgment normal.         No results found for requested labs within last 30 days. LDL Calculated (mg/dL)   Date Value   11/03/2020 85       Lab Results   Component Value Date    WBC 7.3 11/03/2020    WBC 13.8 04/26/2016    WBC 8.4 03/15/2011    NEUTROABS 4.4 11/03/2020    NEUTROABS 11.6 04/26/2016    HGB 13.2 11/03/2020    HGB 13.1 04/26/2016    HGB 13.6 03/15/2011    HCT 39.5 11/03/2020    HCT 39.9 04/26/2016    HCT 41.0 03/15/2011    MCV 91.8 11/03/2020    MCV 89.5 04/26/2016    MCV 91.5 03/15/2011     11/03/2020     04/26/2016     03/15/2011    LYMPHSABS 2.2 11/03/2020    MONOSABS 0.6 11/03/2020    EOSABS 0.1 11/03/2020    BASOSABS 0.0 11/03/2020     Lab Results   Component Value Date    TSH 2.83 11/03/2020    TSHHS 3.413 03/15/2011     Lab Results   Component Value Date    LABALBU 4.1 07/27/2020    BILITOT <0.2 07/27/2020    AST 16 07/27/2020    ALT 12 07/27/2020    ALKPHOS 56 07/27/2020             No results found for this visit on 10/19/21.     ASSESSMENT AND PLAN: 1. Middle ear effusion, left  - azithromycin (ZITHROMAX) 250 MG tablet; Take 1 tablet by mouth See Admin Instructions for 5 days 500mg on day 1 followed by 250mg on days 2 - 5  Dispense: 6 tablet; Refill: 0    2. Need for prophylactic antibiotic  - Probiotic Acidophilus (FLORANEX) TABS; Take 1 tablet by mouth 2 times daily  Dispense: 60 tablet; Refill: 0    Fluids, rest  Take prescribed medication as directed  Continue current medication regimen  Verbalized understanding and agreement with plan    Return if symptoms worsen or fail to improve. Care discussed with patient. Patient educated on signs and symptoms of exacerbation and when to seek further medical attention. Advised to call for any problems, questions, or concerns. Patient verbalizes understanding and agrees with plan. Medications reviewed and reconciled. Continue current medications. Appropriate prescriptions are ordered. Risks and benefits of meds are discussed. After visit summary provided.

## 2021-10-20 ASSESSMENT — ENCOUNTER SYMPTOMS
GASTROINTESTINAL NEGATIVE: 1
CHOKING: 0
SINUS PAIN: 0
SINUS PRESSURE: 0
SHORTNESS OF BREATH: 0
COUGH: 0
FACIAL SWELLING: 0
RHINORRHEA: 0
TROUBLE SWALLOWING: 0
CHEST TIGHTNESS: 0
SORE THROAT: 0
WHEEZING: 0

## 2021-12-21 DIAGNOSIS — K21.9 CHRONIC GERD: ICD-10-CM

## 2021-12-21 DIAGNOSIS — E03.8 OTHER SPECIFIED HYPOTHYROIDISM: ICD-10-CM

## 2021-12-21 DIAGNOSIS — Z76.0 MEDICATION REFILL: ICD-10-CM

## 2021-12-21 DIAGNOSIS — E78.5 HYPERLIPIDEMIA, UNSPECIFIED HYPERLIPIDEMIA TYPE: ICD-10-CM

## 2021-12-22 RX ORDER — LEVOTHYROXINE SODIUM 0.07 MG/1
TABLET ORAL
Qty: 90 TABLET | Refills: 1 | Status: SHIPPED | OUTPATIENT
Start: 2021-12-22 | End: 2022-07-05 | Stop reason: SDUPTHER

## 2021-12-22 RX ORDER — OMEPRAZOLE 40 MG/1
CAPSULE, DELAYED RELEASE ORAL
Qty: 90 CAPSULE | Refills: 1 | Status: SHIPPED | OUTPATIENT
Start: 2021-12-22 | End: 2022-05-17

## 2021-12-22 RX ORDER — ATORVASTATIN CALCIUM 40 MG/1
TABLET, FILM COATED ORAL
Qty: 90 TABLET | Refills: 1 | Status: SHIPPED | OUTPATIENT
Start: 2021-12-22 | End: 2022-05-26

## 2022-01-11 ENCOUNTER — NURSE ONLY (OUTPATIENT)
Dept: FAMILY MEDICINE CLINIC | Age: 42
End: 2022-01-11
Payer: COMMERCIAL

## 2022-01-11 DIAGNOSIS — Z23 NEED FOR INFLUENZA VACCINATION: Primary | ICD-10-CM

## 2022-01-11 PROCEDURE — 90471 IMMUNIZATION ADMIN: CPT | Performed by: NURSE PRACTITIONER

## 2022-01-11 PROCEDURE — 90674 CCIIV4 VAC NO PRSV 0.5 ML IM: CPT | Performed by: NURSE PRACTITIONER

## 2022-05-16 DIAGNOSIS — Z76.0 MEDICATION REFILL: ICD-10-CM

## 2022-05-16 DIAGNOSIS — K21.9 CHRONIC GERD: ICD-10-CM

## 2022-05-17 RX ORDER — OMEPRAZOLE 40 MG/1
CAPSULE, DELAYED RELEASE ORAL
Qty: 90 CAPSULE | Refills: 1 | Status: SHIPPED | OUTPATIENT
Start: 2022-05-17

## 2022-05-23 DIAGNOSIS — T75.3XXA MOTION SICKNESS, INITIAL ENCOUNTER: ICD-10-CM

## 2022-05-23 RX ORDER — SCOLOPAMINE TRANSDERMAL SYSTEM 1 MG/1
1 PATCH, EXTENDED RELEASE TRANSDERMAL
Qty: 2 PATCH | Refills: 0 | Status: SHIPPED | OUTPATIENT
Start: 2022-05-23 | End: 2022-05-26

## 2022-05-23 RX ORDER — SCOLOPAMINE TRANSDERMAL SYSTEM 1 MG/1
1 PATCH, EXTENDED RELEASE TRANSDERMAL
Qty: 1 PATCH | Refills: 0 | Status: CANCELLED | OUTPATIENT
Start: 2022-05-23 | End: 2022-05-26

## 2022-05-23 NOTE — TELEPHONE ENCOUNTER
Pt requests 2 patches due to she uses the patch during traveling and will need a patch on the way to the destination and upon returning.     Note to ELIEL Horner

## 2022-05-25 DIAGNOSIS — Z76.0 MEDICATION REFILL: ICD-10-CM

## 2022-05-25 DIAGNOSIS — E78.5 HYPERLIPIDEMIA, UNSPECIFIED HYPERLIPIDEMIA TYPE: ICD-10-CM

## 2022-05-26 RX ORDER — ATORVASTATIN CALCIUM 40 MG/1
TABLET, FILM COATED ORAL
Qty: 90 TABLET | Refills: 0 | Status: SHIPPED | OUTPATIENT
Start: 2022-05-26 | End: 2022-10-25 | Stop reason: SDUPTHER

## 2022-07-05 DIAGNOSIS — Z76.0 MEDICATION REFILL: ICD-10-CM

## 2022-07-05 DIAGNOSIS — E03.8 OTHER SPECIFIED HYPOTHYROIDISM: ICD-10-CM

## 2022-07-05 RX ORDER — BUSPIRONE HYDROCHLORIDE 10 MG/1
TABLET ORAL
COMMUNITY
Start: 2022-06-10

## 2022-07-05 RX ORDER — SUMATRIPTAN 50 MG/1
TABLET, FILM COATED ORAL
COMMUNITY
Start: 2022-06-10

## 2022-07-05 RX ORDER — ESCITALOPRAM OXALATE 20 MG/1
TABLET ORAL
COMMUNITY
Start: 2022-06-19

## 2022-07-05 RX ORDER — LEVOTHYROXINE SODIUM 0.07 MG/1
TABLET ORAL
Qty: 90 TABLET | Refills: 1 | Status: SHIPPED | OUTPATIENT
Start: 2022-07-05

## 2022-07-06 ENCOUNTER — TELEPHONE (OUTPATIENT)
Dept: FAMILY MEDICINE CLINIC | Age: 42
End: 2022-07-06

## 2022-07-06 NOTE — TELEPHONE ENCOUNTER
[facility] pt needs appt, last visit [phone]. Also lab work needed TSH. OK if she has OB/GYN faxes Jennifer results.

## 2022-08-16 DIAGNOSIS — R06.83 SNORING: Primary | ICD-10-CM

## 2022-08-16 DIAGNOSIS — R53.83 OTHER FATIGUE: ICD-10-CM

## 2022-08-16 NOTE — PROGRESS NOTES
Patient complaining of snoring. She states the baby has been waking up because of her loud snores. She also complains of increasing fatigue. Requesting a sleep study. Order sent to sleep center. Verbalized understanding and agreement with plan. declines

## 2022-08-22 ENCOUNTER — NURSE ONLY (OUTPATIENT)
Dept: FAMILY MEDICINE CLINIC | Age: 42
End: 2022-08-22

## 2022-08-22 DIAGNOSIS — Z11.1 TUBERCULOSIS SCREENING: Primary | ICD-10-CM

## 2022-08-22 PROCEDURE — 86480 TB TEST CELL IMMUN MEASURE: CPT | Performed by: NURSE PRACTITIONER

## 2022-08-22 SDOH — ECONOMIC STABILITY: FOOD INSECURITY: WITHIN THE PAST 12 MONTHS, THE FOOD YOU BOUGHT JUST DIDN'T LAST AND YOU DIDN'T HAVE MONEY TO GET MORE.: NEVER TRUE

## 2022-08-22 SDOH — ECONOMIC STABILITY: FOOD INSECURITY: WITHIN THE PAST 12 MONTHS, YOU WORRIED THAT YOUR FOOD WOULD RUN OUT BEFORE YOU GOT MONEY TO BUY MORE.: NEVER TRUE

## 2022-08-22 ASSESSMENT — SOCIAL DETERMINANTS OF HEALTH (SDOH): HOW HARD IS IT FOR YOU TO PAY FOR THE VERY BASICS LIKE FOOD, HOUSING, MEDICAL CARE, AND HEATING?: NOT HARD AT ALL

## 2022-08-25 ENCOUNTER — HOSPITAL ENCOUNTER (OUTPATIENT)
Dept: SLEEP CENTER | Age: 42
Discharge: HOME | End: 2022-08-25
Payer: COMMERCIAL

## 2022-08-25 VITALS
WEIGHT: 240 LBS | DIASTOLIC BLOOD PRESSURE: 78 MMHG | HEIGHT: 63 IN | SYSTOLIC BLOOD PRESSURE: 123 MMHG | BODY MASS INDEX: 42.52 KG/M2 | HEART RATE: 78 BPM | OXYGEN SATURATION: 95 %

## 2022-08-25 DIAGNOSIS — E66.01 MORBID OBESITY WITH BMI OF 40.0-44.9, ADULT (HCC): ICD-10-CM

## 2022-08-25 DIAGNOSIS — G47.10 HYPERSOMNIA: ICD-10-CM

## 2022-08-25 DIAGNOSIS — G47.33 OSA (OBSTRUCTIVE SLEEP APNEA): ICD-10-CM

## 2022-08-25 PROCEDURE — 99204 OFFICE O/P NEW MOD 45 MIN: CPT | Performed by: INTERNAL MEDICINE

## 2022-08-25 PROCEDURE — 99211 OFF/OP EST MAY X REQ PHY/QHP: CPT

## 2022-08-25 RX ORDER — LANOLIN ALCOHOL/MO/W.PET/CERES
3 CREAM (GRAM) TOPICAL DAILY
COMMUNITY

## 2022-08-25 ASSESSMENT — SLEEP AND FATIGUE QUESTIONNAIRES
HOW LIKELY ARE YOU TO NOD OFF OR FALL ASLEEP IN A CAR, WHILE STOPPED FOR A FEW MINUTES IN TRAFFIC: 0
HOW LIKELY ARE YOU TO NOD OFF OR FALL ASLEEP WHILE LYING DOWN TO REST IN THE AFTERNOON WHEN CIRCUMSTANCES PERMIT: 3
HOW LIKELY ARE YOU TO NOD OFF OR FALL ASLEEP WHEN YOU ARE A PASSENGER IN A CAR FOR AN HOUR WITHOUT A BREAK: 3
HOW LIKELY ARE YOU TO NOD OFF OR FALL ASLEEP WHILE SITTING QUIETLY AFTER LUNCH WITHOUT ALCOHOL: 3
HOW LIKELY ARE YOU TO NOD OFF OR FALL ASLEEP WHILE SITTING INACTIVE IN A PUBLIC PLACE: 2
HOW LIKELY ARE YOU TO NOD OFF OR FALL ASLEEP WHILE SITTING AND READING: 0
HOW LIKELY ARE YOU TO NOD OFF OR FALL ASLEEP WHILE WATCHING TV: 3
NECK CIRCUMFERENCE (INCHES): 15.25
ESS TOTAL SCORE: 14
HOW LIKELY ARE YOU TO NOD OFF OR FALL ASLEEP WHILE SITTING AND TALKING TO SOMEONE: 0

## 2022-08-25 NOTE — CONSULTS
Spouse name: Not on file    Number of children: Not on file    Years of education: Not on file    Highest education level: Not on file   Occupational History    Occupation: rehab nurse    Tobacco Use    Smoking status: Never    Smokeless tobacco: Never   Substance and Sexual Activity    Alcohol use: No    Drug use: No    Sexual activity: Never   Other Topics Concern    Not on file   Social History Narrative    Not on file     Social Determinants of Health     Financial Resource Strain: Low Risk     Difficulty of Paying Living Expenses: Not hard at all   Food Insecurity: No Food Insecurity    Worried About Running Out of Food in the Last Year: Never true    Ran Out of Food in the Last Year: Never true   Transportation Needs: Not on file   Physical Activity: Not on file   Stress: Not on file   Social Connections: Not on file   Intimate Partner Violence: Not on file   Housing Stability: Not on file       Prior to Admission medications    Medication Sig Start Date End Date Taking? Authorizing Provider   EVENING PRIMROSE OIL PO Take by mouth   Yes Historical Provider, MD   melatonin 3 MG TABS tablet Take 3 mg by mouth daily   Yes Historical Provider, MD   busPIRone (BUSPAR) 10 MG tablet TAKE 1 TABLET BY MOUTH TWO TIMES A DAY AS NEEDED 6/10/22  Yes Historical Provider, MD   SUMAtriptan (IMITREX) 50 MG tablet TAKE 1 TABLET BY MOUTH AT ONSET OF MIGRAINE, MAY REPEAT DOSE AFTER 2 HOURS IF NEEDED.  NOT TO EXCEED 200 MG IN 24 HOURS, AS DIRECTED. 6/10/22  Yes Historical Provider, MD   escitalopram (LEXAPRO) 20 MG tablet TAKE 1 TABLET BY MOUTH EVERY DAY 6/19/22  Yes Historical Provider, MD   levothyroxine (SYNTHROID) 75 MCG tablet TAKE 1 TABLET DAILY 7/5/22  Yes Omega Puente, APRN - CNP   atorvastatin (LIPITOR) 40 MG tablet TAKE 1 TABLET DAILY 5/26/22  Yes Omega Puente, APRN - CNP   omeprazole (PRILOSEC) 40 MG delayed release capsule TAKE 1 CAPSULE DAILY 5/17/22  Yes Omega Puente, APRN - CNP   tiZANidine (ZANAFLEX) 2 MG tablet Take 1 tablet by mouth nightly 8/16/21  Yes Historical Provider, MD   albuterol sulfate HFA (VENTOLIN HFA) 108 (90 Base) MCG/ACT inhaler Inhale 2 puffs into the lungs 4 times daily as needed for Wheezing 1/8/21  Yes ELIEL Modi CNP   albuterol (PROVENTIL) (2.5 MG/3ML) 0.083% nebulizer solution Take 3 mLs by nebulization every 6 hours as needed for Wheezing 8/3/20  Yes ELIEL Modi CNP   norgestimate-ethinyl estradiol (ESTARYLLA) 0.25-35 MG-MCG per tablet Take 1 tablet by mouth daily 10/10/19  Yes ELIEL Modi CNP   cetirizine (ZYRTEC) 10 MG tablet Take 10 mg by mouth 4 times daily as needed    Yes Historical Provider, MD   Calcium Citrate-Vitamin D 200-250 MG-UNIT TABS Take 2 tablets by mouth daily   Yes Historical Provider, MD   Multiple Vitamin (MULTI VITAMIN DAILY PO) Take 2 tablets by mouth daily   Yes Historical Provider, MD   fluticasone (FLONASE) 50 MCG/ACT nasal spray 1 spray by Nasal route 2 times daily   Yes Historical Provider, MD   ferrous sulfate 325 (65 FE) MG tablet Take 28 mg by mouth daily (with breakfast)  Patient not taking: Reported on 8/25/2022    Historical Provider, MD   BIOTIN PO Take 1 tablet by mouth daily  Patient not taking: No sig reported    Historical Provider, MD       Allergies as of 08/25/2022 - Fully Reviewed 08/25/2022   Allergen Reaction Noted    Latex Itching 04/13/2016    Dust mite extract  12/10/2015    Molds & smuts  12/10/2015    Nicotine Other (See Comments) 10/10/2019    Oxybutynin Other (See Comments)     Pcn [penicillins] Other (See Comments)     Tape Janell Copas tape] Rash 12/10/2015       Patient Active Problem List   Diagnosis    Hypothyroid    S/P laparoscopic sleeve gastrectomy    Allergic rhinitis    Depressive disorder    Acne    Gout    Asthma    Menstrual cycle disorder    LATASHA (obstructive sleep apnea)    Hypersomnia    Morbid obesity with BMI of 40.0-44.9, adult [facility])       Past Medical History:   Diagnosis Date    Acne     Allergic rhinitis     Asthma     Depressive disorder     Gout     Hypothyroid     Morbid obesity with BMI of 45.0-49.9, adult [facility])        Past Surgical History:   Procedure Laterality Date    LASIK  2007    dr fish    SLEEVE GASTRECTOMY  4/25/16    LAPAROSCOPIC SLEEVE GASTRECTOMY - ETHICON    WISDOM TOOTH EXTRACTION  2000       Family History   Problem Relation Age of Onset    High Blood Pressure Father     High Cholesterol Father     Coronary Art Dis Father     Diabetes Father     Arthritis Father     Heart Attack Father         at age 46    Depression Mother     Asthma Mother     Allergies Mother     Depression Brother     Diabetes Paternal Uncle     Cancer Maternal Grandfather     Uterine Cancer Paternal Grandmother     Diabetes Other     Colon Cancer Neg Hx     Allergic Rhinitis Neg Hx          Objective:   /78   Pulse 78   Ht 5' 3\" (1.6 m)   Wt 240 lb (108.9 kg)   SpO2 95%   BMI 42.51 kg/m²   Body mass index is 42.51 kg/m². Sleep Medicine 8/25/2022   Sitting and reading 0   Watching TV 3   Sitting, inactive in a public place (e.g. a theatre or a meeting) 2   As a passenger in a car for an hour without a break 3   Lying down to rest in the afternoon when circumstances permit 3   Sitting and talking to someone 0   Sitting quietly after a lunch without alcohol 3   In a car, while stopped for a few minutes in traffic 0   Total score 14   Neck circumference (Inches) 15.25     Mallampati 3    Vitals:    08/25/22 1107   BP: 123/78   Pulse: 78   SpO2: 95%   Weight: 240 lb (108.9 kg)   Height: 5' 3\" (1.6 m)     Neck circumference (Inches): 15.25  Inches  Machias - Total score: 14    Gen: No distress. Eyes: PERRL. No sclera icterus. No conjunctival injection. ENT: No discharge. Pharynx clear. External appearance of ears and nose normal.  Neck: Trachea midline. No obvious mass. Resp: No accessory muscle use. No crackles. No wheezes. No rhonchi. No dullness on percussion. CV: Regular rate. Regular rhythm.  No murmur or rub. No edema. GI: Non-tender. Non-distended. No hernia. Skin: Warm, dry, normal texture and turgor. No nodule on exposed extremities. Lymph: No cervical LAD. No supraclavicular LAD. M/S: No cyanosis. No clubbing. No joint deformity. Psych: Oriented x 3. No anxiety. Awake. Alert. Intact judgement and insight. Mallampati Airway Classification:   []1 []2 [x]3 []4        Assessment and Plan     Diagnosis:    Problem List          Respiratory    LATASHA (obstructive sleep apnea)      She has all the symptoms of LATASHA  Advised to go for the PSG  Loose weight         Relevant Orders    Baseline Diagnostic Sleep Study       Other    Hypersomnia      She has all the symptoms of LATASHA  Advised to go for the PSG  Loose weight           Morbid obesity with BMI of 40.0-44.9, adult ([facility] [address]      Advised to loose weight with diet and exercise                    Additional Plan:     [x]  Sleep hygiene/ relaxation methods & CBTi principles review with patient   [x]  Avoid supine/back sleep until sleep study   [x]  Driving precautions   [x]  Medical consequences of untreated LATASHA   [x]  Weight loss recommendations   [x]  Diet recommendations   [x]  Exercise   []  Advised to quit smoking       []  PFT referral   []  Bariatric Program referral      Follow-Up:    No follow-ups on file.     Electronically signed by Wyatt Eisenmenger, MD on 8/25/2022 at 11:42 AM

## 2022-09-28 ENCOUNTER — HOSPITAL ENCOUNTER (OUTPATIENT)
Dept: SLEEP CENTER | Age: 42
Discharge: HOME | End: 2022-09-28
Payer: COMMERCIAL

## 2022-09-28 VITALS — WEIGHT: 240 LBS | HEIGHT: 63 IN | BODY MASS INDEX: 42.52 KG/M2

## 2022-09-28 DIAGNOSIS — G47.33 OSA (OBSTRUCTIVE SLEEP APNEA): ICD-10-CM

## 2022-09-28 PROCEDURE — 95810 POLYSOM 6/> YRS 4/> PARAM: CPT

## 2022-09-29 ENCOUNTER — NURSE ONLY (OUTPATIENT)
Dept: FAMILY MEDICINE CLINIC | Age: 42
End: 2022-09-29
Payer: COMMERCIAL

## 2022-09-29 DIAGNOSIS — Z23 NEED FOR INFLUENZA VACCINATION: Primary | ICD-10-CM

## 2022-09-29 LAB — STATUS: NORMAL

## 2022-09-29 PROCEDURE — 90471 IMMUNIZATION ADMIN: CPT | Performed by: NURSE PRACTITIONER

## 2022-09-29 PROCEDURE — 95810 POLYSOM 6/> YRS 4/> PARAM: CPT | Performed by: INTERNAL MEDICINE

## 2022-09-29 PROCEDURE — 90674 CCIIV4 VAC NO PRSV 0.5 ML IM: CPT | Performed by: NURSE PRACTITIONER

## 2022-09-29 RX ORDER — ESCITALOPRAM OXALATE 10 MG/1
TABLET ORAL
COMMUNITY
Start: 2022-09-28

## 2022-09-29 NOTE — PROGRESS NOTES
9/28/2022  sleep study  for Jim Scott  1980 is complete. Results are pending physician review.     Electronically signed by Douglas Mcmanus on 9/28/2022 at 11:27 PM

## 2022-10-19 DIAGNOSIS — Z76.0 MEDICATION REFILL: ICD-10-CM

## 2022-10-19 DIAGNOSIS — K21.9 CHRONIC GERD: ICD-10-CM

## 2022-10-19 RX ORDER — OMEPRAZOLE 40 MG/1
CAPSULE, DELAYED RELEASE ORAL
Qty: 90 CAPSULE | Refills: 1 | OUTPATIENT
Start: 2022-10-19

## 2022-10-25 DIAGNOSIS — E78.5 HYPERLIPIDEMIA, UNSPECIFIED HYPERLIPIDEMIA TYPE: ICD-10-CM

## 2022-10-25 DIAGNOSIS — Z76.0 MEDICATION REFILL: ICD-10-CM

## 2022-10-25 RX ORDER — ATORVASTATIN CALCIUM 40 MG/1
TABLET, FILM COATED ORAL
Qty: 90 TABLET | Refills: 0 | Status: SHIPPED | OUTPATIENT
Start: 2022-10-25 | End: 2022-11-17 | Stop reason: SDUPTHER

## 2022-10-26 ENCOUNTER — HOSPITAL ENCOUNTER (OUTPATIENT)
Dept: SLEEP CENTER | Age: 42
Discharge: HOME | End: 2022-10-26
Payer: COMMERCIAL

## 2022-10-26 DIAGNOSIS — E66.01 MORBID OBESITY WITH BMI OF 40.0-44.9, ADULT (HCC): ICD-10-CM

## 2022-10-26 DIAGNOSIS — G47.33 OSA (OBSTRUCTIVE SLEEP APNEA): ICD-10-CM

## 2022-10-26 DIAGNOSIS — G47.10 HYPERSOMNIA: ICD-10-CM

## 2022-10-26 PROCEDURE — 99214 OFFICE O/P EST MOD 30 MIN: CPT | Performed by: INTERNAL MEDICINE

## 2022-10-26 PROCEDURE — 9990000010 HC NO CHARGE VISIT

## 2022-10-26 ASSESSMENT — ENCOUNTER SYMPTOMS
EYE ITCHING: 0
BACK PAIN: 0
EYE DISCHARGE: 0
SHORTNESS OF BREATH: 0
CHOKING: 0
ABDOMINAL DISTENTION: 0
ABDOMINAL PAIN: 0

## 2022-10-26 NOTE — PROGRESS NOTES
Tiannaeri Breaux  1980  Referring Provider: ELIEL hCoi CNP    Subjective:     Chief Complaint   Patient presents with    Sleep Apnea       HPI  Cassy Georges is a 37 y.o. female has come back as a follow up. She had a PSG done on 09/28/22 showed that she has an AHI 6.7 and desat to 89%. She is still sleepy and tired during the day time. She has no loss of weight. Current Outpatient Medications   Medication Sig Dispense Refill    atorvastatin (LIPITOR) 40 MG tablet Take one tablet by mouth every day. 90 tablet 0    escitalopram (LEXAPRO) 10 MG tablet       EVENING PRIMROSE OIL PO Take by mouth      melatonin 3 MG TABS tablet Take 3 mg by mouth daily      busPIRone (BUSPAR) 10 MG tablet TAKE 1 TABLET BY MOUTH TWO TIMES A DAY AS NEEDED      SUMAtriptan (IMITREX) 50 MG tablet TAKE 1 TABLET BY MOUTH AT ONSET OF MIGRAINE, MAY REPEAT DOSE AFTER 2 HOURS IF NEEDED.  NOT TO EXCEED 200 MG IN 24 HOURS, AS DIRECTED.      escitalopram (LEXAPRO) 20 MG tablet TAKE 1 TABLET BY MOUTH EVERY DAY      levothyroxine (SYNTHROID) 75 MCG tablet TAKE 1 TABLET DAILY 90 tablet 1    omeprazole (PRILOSEC) 40 MG delayed release capsule TAKE 1 CAPSULE DAILY 90 capsule 1    tiZANidine (ZANAFLEX) 2 MG tablet Take 1 tablet by mouth nightly      albuterol sulfate HFA (VENTOLIN HFA) 108 (90 Base) MCG/ACT inhaler Inhale 2 puffs into the lungs 4 times daily as needed for Wheezing 1 Inhaler 1    albuterol (PROVENTIL) (2.5 MG/3ML) 0.083% nebulizer solution Take 3 mLs by nebulization every 6 hours as needed for Wheezing 25 each 0    norgestimate-ethinyl estradiol (ESTARYLLA) 0.25-35 MG-MCG per tablet Take 1 tablet by mouth daily 3 packet 3    cetirizine (ZYRTEC) 10 MG tablet Take 10 mg by mouth 4 times daily as needed       Calcium Citrate-Vitamin D 200-250 MG-UNIT TABS Take 2 tablets by mouth daily      Multiple Vitamin (MULTI VITAMIN DAILY PO) Take 2 tablets by mouth daily      BIOTIN PO Take 1 tablet by mouth daily      fluticasone (FLONASE) 50 MCG/ACT nasal spray 1 spray by Nasal route 2 times daily      ferrous sulfate 325 (65 FE) MG tablet Take 28 mg by mouth daily (with breakfast) (Patient not taking: No sig reported)       No current facility-administered medications for this encounter. Allergies   Allergen Reactions    Latex Itching    Dust Mite Extract     Molds & Smuts     Nicotine Other (See Comments)     Being around it can cause Nicotine      Oxybutynin Other (See Comments)     Constipation/drymouth     Pcn [Penicillins] Other (See Comments)     avoids    Tape Rommie Pear Tape] Rash       Past Medical History:   Diagnosis Date    Acne     Allergic rhinitis     Asthma     Depressive disorder     Gout     Hypothyroid     Morbid obesity with BMI of 45.0-49.9, adult [facility])        Past Surgical History:   Procedure Laterality Date    LASIK  2007    dr fish    SLEEVE GASTRECTOMY  4/25/16    LAPAROSCOPIC SLEEVE GASTRECTOMY - ETHICON    WISDOM TOOTH EXTRACTION  2000       Social History     Socioeconomic History    Marital status:    Occupational History    Occupation: rehab nurse    Tobacco Use    Smoking status: Never    Smokeless tobacco: Never   Substance and Sexual Activity    Alcohol use: No    Drug use: No    Sexual activity: Never     Social Determinants of Health     Financial Resource Strain: Low Risk     Difficulty of Paying Living Expenses: Not hard at all   Food Insecurity: No Food Insecurity    Worried About Running Out of Food in the Last Year: Never true    Ran Out of Food in the Last Year: Never true       Review of Systems   Constitutional:  Positive for fatigue. HENT:  Negative for congestion and postnasal drip. Eyes:  Negative for discharge and itching. Respiratory:  Negative for choking and shortness of breath. Cardiovascular:  Negative for chest pain and leg swelling. Gastrointestinal:  Negative for abdominal distention and abdominal pain. Endocrine: Negative for cold intolerance and heat intolerance. Genitourinary:  Negative for dysuria and enuresis. Musculoskeletal:  Negative for arthralgias and back pain. Allergic/Immunologic: Negative for environmental allergies and food allergies. Neurological:  Negative for light-headedness and headaches. Hematological:  Negative for adenopathy. Psychiatric/Behavioral:  Negative for agitation and behavioral problems. Objective: There were no vitals taken for this visit. There is no height or weight on file to calculate BMI. Sleep Medicine 8/25/2022   Sitting and reading 0   Watching TV 3   Sitting, inactive in a public place (e.g. a theatre or a meeting) 2   As a passenger in a car for an hour without a break 3   Lying down to rest in the afternoon when circumstances permit 3   Sitting and talking to someone 0   Sitting quietly after a lunch without alcohol 3   In a car, while stopped for a few minutes in traffic 0   [place] Sleepiness Score 14   Neck circumference (Inches) 15.25     Mallampati 3    Physical Exam  Vitals reviewed. Constitutional:       Appearance: Normal appearance. HENT:      Head: Normocephalic and atraumatic. Nose: Nose normal.      Mouth/Throat:      Mouth: Mucous membranes are moist.   Eyes:      Extraocular Movements: Extraocular movements intact. Pupils: Pupils are equal, round, and reactive to light. Cardiovascular:      Rate and Rhythm: Normal rate and regular rhythm. Pulses: Normal pulses. Heart sounds: Normal heart sounds. Pulmonary:      Effort: Pulmonary effort is normal.      Breath sounds: Normal breath sounds. Abdominal:      General: Abdomen is flat. Palpations: Abdomen is soft. Musculoskeletal:         General: Normal range of motion. Cervical back: Normal range of motion and neck supple. Skin:     General: Skin is warm and dry. Neurological:      General: No focal deficit present. Mental Status: She is alert and oriented to person, place, and time.    Psychiatric: Mood and Affect: Mood normal.         Behavior: Behavior normal.       Radiology: None    Assessment and Plan     Problem List          Respiratory    LATASHA (obstructive sleep apnea)      She has mild LATASHA with EDS  Advised to go for the CPAP titration study  Loose weight         Relevant Orders    Sleep Study with PAP Titration       Other    Hypersomnia      She has mild LATASHA with EDS  Advised to go for the CPAP titration study  Loose weight           Morbid obesity with BMI of 40.0-44.9, adult ([address]      Advised to loose weight with diet and exercise                   Follow-Up:    No follow-ups on file.      Progress notes sent to the referring Provider    Dayna Rose MD MD  10/26/2022  4:50 PM

## 2022-10-28 ENCOUNTER — HOSPITAL ENCOUNTER (OUTPATIENT)
Dept: SLEEP CENTER | Age: 42
Discharge: HOME | End: 2022-10-28
Payer: COMMERCIAL

## 2022-10-28 VITALS — HEIGHT: 63 IN | BODY MASS INDEX: 42.52 KG/M2 | WEIGHT: 240 LBS

## 2022-10-28 DIAGNOSIS — G47.33 OSA (OBSTRUCTIVE SLEEP APNEA): ICD-10-CM

## 2022-10-28 PROCEDURE — 95811 POLYSOM 6/>YRS CPAP 4/> PARM: CPT | Performed by: INTERNAL MEDICINE

## 2022-10-28 PROCEDURE — 95811 POLYSOM 6/>YRS CPAP 4/> PARM: CPT

## 2022-10-28 ASSESSMENT — SLEEP AND FATIGUE QUESTIONNAIRES
HOW LIKELY ARE YOU TO NOD OFF OR FALL ASLEEP IN A CAR, WHILE STOPPED FOR A FEW MINUTES IN TRAFFIC: 0
ESS TOTAL SCORE: 14
HOW LIKELY ARE YOU TO NOD OFF OR FALL ASLEEP WHILE SITTING AND TALKING TO SOMEONE: 0
HOW LIKELY ARE YOU TO NOD OFF OR FALL ASLEEP WHEN YOU ARE A PASSENGER IN A CAR FOR AN HOUR WITHOUT A BREAK: 3
HOW LIKELY ARE YOU TO NOD OFF OR FALL ASLEEP WHILE WATCHING TV: 3
HOW LIKELY ARE YOU TO NOD OFF OR FALL ASLEEP WHILE SITTING AND READING: 0
HOW LIKELY ARE YOU TO NOD OFF OR FALL ASLEEP WHILE SITTING QUIETLY AFTER LUNCH WITHOUT ALCOHOL: 3
HOW LIKELY ARE YOU TO NOD OFF OR FALL ASLEEP WHILE LYING DOWN TO REST IN THE AFTERNOON WHEN CIRCUMSTANCES PERMIT: 3
HOW LIKELY ARE YOU TO NOD OFF OR FALL ASLEEP WHILE SITTING INACTIVE IN A PUBLIC PLACE: 2

## 2022-10-29 NOTE — PROGRESS NOTES
10/29/2022  sleep study  for Thanh Sosa  1980 is complete. Results are pending physician review.     Electronically signed by Anamaria Marvin on 10/29/2022 at 7:28 AM

## 2022-11-17 ENCOUNTER — OFFICE VISIT (OUTPATIENT)
Dept: FAMILY MEDICINE CLINIC | Age: 42
End: 2022-11-17
Payer: COMMERCIAL

## 2022-11-17 VITALS
RESPIRATION RATE: 16 BRPM | BODY MASS INDEX: 43.72 KG/M2 | OXYGEN SATURATION: 98 % | DIASTOLIC BLOOD PRESSURE: 74 MMHG | WEIGHT: 246.8 LBS | TEMPERATURE: 97.6 F | SYSTOLIC BLOOD PRESSURE: 120 MMHG | HEART RATE: 78 BPM

## 2022-11-17 DIAGNOSIS — J45.20 MILD INTERMITTENT ASTHMA WITHOUT COMPLICATION: ICD-10-CM

## 2022-11-17 DIAGNOSIS — N92.6 MENSTRUAL CYCLE DISORDER: ICD-10-CM

## 2022-11-17 DIAGNOSIS — H91.91 HEARING LOSS OF RIGHT EAR, UNSPECIFIED HEARING LOSS TYPE: ICD-10-CM

## 2022-11-17 DIAGNOSIS — E03.8 OTHER SPECIFIED HYPOTHYROIDISM: ICD-10-CM

## 2022-11-17 DIAGNOSIS — J01.00 ACUTE NON-RECURRENT MAXILLARY SINUSITIS: Primary | ICD-10-CM

## 2022-11-17 DIAGNOSIS — K21.9 CHRONIC GERD: ICD-10-CM

## 2022-11-17 DIAGNOSIS — E78.5 HYPERLIPIDEMIA, UNSPECIFIED HYPERLIPIDEMIA TYPE: ICD-10-CM

## 2022-11-17 DIAGNOSIS — Z76.0 MEDICATION REFILL: ICD-10-CM

## 2022-11-17 PROCEDURE — G8427 DOCREV CUR MEDS BY ELIG CLIN: HCPCS | Performed by: NURSE PRACTITIONER

## 2022-11-17 PROCEDURE — 1036F TOBACCO NON-USER: CPT | Performed by: NURSE PRACTITIONER

## 2022-11-17 PROCEDURE — G8482 FLU IMMUNIZE ORDER/ADMIN: HCPCS | Performed by: NURSE PRACTITIONER

## 2022-11-17 PROCEDURE — G8417 CALC BMI ABV UP PARAM F/U: HCPCS | Performed by: NURSE PRACTITIONER

## 2022-11-17 PROCEDURE — 99214 OFFICE O/P EST MOD 30 MIN: CPT | Performed by: NURSE PRACTITIONER

## 2022-11-17 RX ORDER — NORGESTIMATE AND ETHINYL ESTRADIOL 0.25-0.035
1 KIT ORAL DAILY
Qty: 3 PACKET | Refills: 3 | Status: SHIPPED | OUTPATIENT
Start: 2022-11-17

## 2022-11-17 RX ORDER — LEVOTHYROXINE SODIUM 0.07 MG/1
TABLET ORAL
Qty: 90 TABLET | Refills: 1 | Status: SHIPPED | OUTPATIENT
Start: 2022-11-17

## 2022-11-17 RX ORDER — ALBUTEROL SULFATE 90 UG/1
2 AEROSOL, METERED RESPIRATORY (INHALATION) 4 TIMES DAILY PRN
Qty: 1 EACH | Refills: 1 | Status: SHIPPED | OUTPATIENT
Start: 2022-11-17

## 2022-11-17 RX ORDER — AZITHROMYCIN 250 MG/1
250 TABLET, FILM COATED ORAL SEE ADMIN INSTRUCTIONS
Qty: 6 TABLET | Refills: 0 | Status: SHIPPED | OUTPATIENT
Start: 2022-11-17 | End: 2022-11-22

## 2022-11-17 RX ORDER — ATORVASTATIN CALCIUM 40 MG/1
TABLET, FILM COATED ORAL
Qty: 90 TABLET | Refills: 0 | Status: SHIPPED | OUTPATIENT
Start: 2022-11-17

## 2022-11-17 RX ORDER — OMEPRAZOLE 40 MG/1
CAPSULE, DELAYED RELEASE ORAL
Qty: 90 CAPSULE | Refills: 1 | Status: SHIPPED | OUTPATIENT
Start: 2022-11-17

## 2022-11-17 RX ORDER — ALBUTEROL SULFATE 2.5 MG/3ML
2.5 SOLUTION RESPIRATORY (INHALATION) EVERY 6 HOURS PRN
Qty: 25 EACH | Refills: 0 | Status: SHIPPED | OUTPATIENT
Start: 2022-11-17

## 2022-11-17 ASSESSMENT — PATIENT HEALTH QUESTIONNAIRE - PHQ9
SUM OF ALL RESPONSES TO PHQ QUESTIONS 1-9: 6
2. FEELING DOWN, DEPRESSED OR HOPELESS: 0
5. POOR APPETITE OR OVEREATING: 0
10. IF YOU CHECKED OFF ANY PROBLEMS, HOW DIFFICULT HAVE THESE PROBLEMS MADE IT FOR YOU TO DO YOUR WORK, TAKE CARE OF THINGS AT HOME, OR GET ALONG WITH OTHER PEOPLE: 1
4. FEELING TIRED OR HAVING LITTLE ENERGY: 3
SUM OF ALL RESPONSES TO PHQ QUESTIONS 1-9: 6
SUM OF ALL RESPONSES TO PHQ QUESTIONS 1-9: 6
3. TROUBLE FALLING OR STAYING ASLEEP: 0
9. THOUGHTS THAT YOU WOULD BE BETTER OFF DEAD, OR OF HURTING YOURSELF: 0
SUM OF ALL RESPONSES TO PHQ QUESTIONS 1-9: 6
7. TROUBLE CONCENTRATING ON THINGS, SUCH AS READING THE NEWSPAPER OR WATCHING TELEVISION: 3
6. FEELING BAD ABOUT YOURSELF - OR THAT YOU ARE A FAILURE OR HAVE LET YOURSELF OR YOUR FAMILY DOWN: 0
8. MOVING OR SPEAKING SO SLOWLY THAT OTHER PEOPLE COULD HAVE NOTICED. OR THE OPPOSITE, BEING SO FIGETY OR RESTLESS THAT YOU HAVE BEEN MOVING AROUND A LOT MORE THAN USUAL: 0

## 2022-11-17 ASSESSMENT — ENCOUNTER SYMPTOMS
TROUBLE SWALLOWING: 0
SINUS PAIN: 0
CHEST TIGHTNESS: 0
SHORTNESS OF BREATH: 0
FACIAL SWELLING: 0

## 2022-11-17 NOTE — PROGRESS NOTES
Sussy Balderas  1980  37 y.o. SUBJECT JOSEY:    Chief Complaint   Patient presents with    Annual Exam    Cholesterol Problem    GI Problem    Medication Refill     Need meds refilled. Sleep Apnea     Got C-pap machine yesterday. Allergies     Allergy induced asthma. Sinus drainage causes sore throat. Donnie Recio is a 37year old female who is in for follow up cholesterol problem GERD, sleep apnea and allergies. She states she will also need medication refills. She states her hearing is getting worse, especially on left. She denies pain or discharge from the ear. She states she is taking her cholesterol medication (atorvastin) and also following low fats and decreased red meat in diet. She states her GI problems have decreased as long as she watches what foods she takes in. She continues to be compliant with thyroid and anti-anxiety medications. She states she has decreased anxiety with medication and also some things that have resolved at work. She is completing her dissertation and will defend on December 8th. She is very excited to be complete with her course work. GI Problem  Primary symptoms do not include fever, weight loss, fatigue or diarrhea. The illness does not include chills or itching. Significant associated medical issues include GERD. Current Outpatient Medications on File Prior to Visit   Medication Sig Dispense Refill    escitalopram (LEXAPRO) 10 MG tablet       EVENING PRIMROSE OIL PO Take by mouth      melatonin 3 MG TABS tablet Take 3 mg by mouth daily      busPIRone (BUSPAR) 10 MG tablet TAKE 1 TABLET BY MOUTH TWO TIMES A DAY AS NEEDED      SUMAtriptan (IMITREX) 50 MG tablet TAKE 1 TABLET BY MOUTH AT ONSET OF MIGRAINE, MAY REPEAT DOSE AFTER 2 HOURS IF NEEDED.  NOT TO EXCEED 200 MG IN 24 HOURS, AS DIRECTED.      escitalopram (LEXAPRO) 20 MG tablet TAKE 1 TABLET BY MOUTH EVERY DAY      tiZANidine (ZANAFLEX) 2 MG tablet Take 1 tablet by mouth nightly cetirizine (ZYRTEC) 10 MG tablet Take 10 mg by mouth 4 times daily as needed       Calcium Citrate-Vitamin D 200-250 MG-UNIT TABS Take 2 tablets by mouth daily      Multiple Vitamin (MULTI VITAMIN DAILY PO) Take 2 tablets by mouth daily      BIOTIN PO Take 1 tablet by mouth daily      fluticasone (FLONASE) 50 MCG/ACT nasal spray 1 spray by Nasal route 2 times daily      ferrous sulfate 325 (65 FE) MG tablet Take 28 mg by mouth daily (with breakfast) (Patient not taking: No sig reported)       No current facility-administered medications on file prior to visit.        Past Medical History:   Diagnosis Date    Acne     Allergic rhinitis     Asthma     Depressive disorder     Gout     Hypothyroid     Morbid obesity with BMI of 45.0-49.9, adult [facility])      Past Surgical History:   Procedure Laterality Date    LASIK  2007    dr fish    SLEEVE GASTRECTOMY  4/25/16    LAPAROSCOPIC SLEEVE GASTRECTOMY - ETHICON    WISDOM TOOTH EXTRACTION  2000     Family History   Problem Relation Age of Onset    High Blood Pressure Father     High Cholesterol Father     Coronary Art Dis Father     Diabetes Father     Arthritis Father     Heart Attack Father         at age 46    Depression Mother     Asthma Mother     Allergies Mother     Depression Brother     Diabetes Paternal Uncle     Cancer Maternal Grandfather     Uterine Cancer Paternal Grandmother     Diabetes Other     Colon Cancer Neg Hx     Allergic Rhinitis Neg Hx      Social History     Socioeconomic History    Marital status:      Spouse name: Not on file    Number of children: Not on file    Years of education: Not on file    Highest education level: Not on file   Occupational History    Occupation: rehab nurse    Tobacco Use    Smoking status: Never    Smokeless tobacco: Never   Vaping Use    Vaping Use: Never used   Substance and Sexual Activity    Alcohol use: No    Drug use: No    Sexual activity: Never   Other Topics Concern    Not on file   Social History Narrative Not on file     Social Determinants of Health     Financial Resource Strain: Low Risk     Difficulty of Paying Living Expenses: Not hard at all   Food Insecurity: No Food Insecurity    Worried About Running Out of Food in the Last Year: Never true    Ran Out of Food in the Last Year: Never true   Transportation Needs: Not on file   Physical Activity: Not on file   Stress: Not on file   Social Connections: Not on file   Intimate Partner Violence: Not on file   Housing Stability: Not on file       Review of Systems   Constitutional:  Negative for activity change, appetite change, chills, diaphoresis, fatigue, fever, unexpected weight change and weight loss. HENT:  Positive for hearing loss. Negative for facial swelling, sinus pain and trouble swallowing. Respiratory:  Negative for cough, chest tightness, shortness of breath and wheezing. Cardiovascular:  Negative for chest pain and palpitations. Gastrointestinal: Negative. Negative for diarrhea. Genitourinary: Negative. Skin: Negative. Negative for itching. Neurological:  Negative for dizziness, weakness, light-headedness and headaches. Psychiatric/Behavioral: Negative. OBJECTIVE:     /74 (Site: Right Upper Arm, Position: Sitting, Cuff Size: Medium Adult)   Pulse 78   Temp 97.6 °F (36.4 °C) (Infrared)   Resp 16   Wt 246 lb 12.8 oz (111.9 kg)   SpO2 98%   BMI 43.72 kg/m²     Physical Exam  Vitals reviewed. Constitutional:       General: She is not in acute distress. Appearance: Normal appearance. She is well-developed. She is obese. She is not ill-appearing or diaphoretic. HENT:      Head: Normocephalic and atraumatic. Right Ear: Ear canal and external ear normal. A middle ear effusion is present. There is no impacted cerumen. No mastoid tenderness. Left Ear: Tympanic membrane, ear canal and external ear normal.  No middle ear effusion. There is no impacted cerumen. No mastoid tenderness.       Nose: No congestion or rhinorrhea. Right Sinus: Maxillary sinus tenderness present. Left Sinus: Maxillary sinus tenderness present. Mouth/Throat:      Mouth: Mucous membranes are moist.      Pharynx: No oropharyngeal exudate. Eyes:      General: No scleral icterus. Conjunctiva/sclera: Conjunctivae normal.      Pupils: Pupils are equal, round, and reactive to light. Cardiovascular:      Rate and Rhythm: Normal rate and regular rhythm. Heart sounds: Normal heart sounds. No murmur heard. No friction rub. No gallop. Pulmonary:      Effort: Pulmonary effort is normal. No respiratory distress. Breath sounds: Normal breath sounds. No wheezing. Chest:      Chest wall: No tenderness. Abdominal:      Palpations: Abdomen is soft. Musculoskeletal:         General: Normal range of motion. Cervical back: Normal range of motion and neck supple. No rigidity or tenderness. Lymphadenopathy:      Cervical: No cervical adenopathy. Skin:     General: Skin is warm and dry. Neurological:      Mental Status: She is alert and oriented to person, place, and time. Psychiatric:         Behavior: Behavior normal.         Thought Content: Thought content normal.         Judgment: Judgment normal.       No results found for requested labs within last 30 days.      LDL Calculated (mg/dL)   Date Value   11/03/2020 85       Lab Results   Component Value Date/Time    WBC 7.3 11/03/2020 11:05 AM    WBC 13.8 04/26/2016 07:24 AM    WBC 8.4 03/15/2011 11:06 AM    NEUTROABS 4.4 11/03/2020 11:05 AM    NEUTROABS 11.6 04/26/2016 07:24 AM    HGB 13.2 11/03/2020 11:05 AM    HGB 13.1 04/26/2016 07:24 AM    HGB 13.6 03/15/2011 11:06 AM    HCT 39.5 11/03/2020 11:05 AM    HCT 39.9 04/26/2016 07:24 AM    HCT 41.0 03/15/2011 11:06 AM    MCV 91.8 11/03/2020 11:05 AM    MCV 89.5 04/26/2016 07:24 AM    MCV 91.5 03/15/2011 11:06 AM     11/03/2020 11:05 AM     04/26/2016 07:24 AM     03/15/2011 11:06 AM LYMPHSABS 2.2 11/03/2020 11:05 AM    MONOSABS 0.6 11/03/2020 11:05 AM    EOSABS 0.1 11/03/2020 11:05 AM    BASOSABS 0.0 11/03/2020 11:05 AM     Lab Results   Component Value Date/Time    TSH 2.83 11/03/2020 11:05 AM    TSHHS 3.413 03/15/2011 11:06 AM     Lab Results   Component Value Date/Time    LABALBU 4.1 07/27/2020 04:12 PM    BILITOT <0.2 07/27/2020 04:12 PM    AST 16 07/27/2020 04:12 PM    ALT 12 07/27/2020 04:12 PM    ALKPHOS 56 07/27/2020 04:12 PM             No results found for this visit on 11/17/22. ASSESSMENT AND PLAN:     1. Acute non-recurrent maxillary sinusitis  - azithromycin (ZITHROMAX) 250 MG tablet; Take 1 tablet by mouth See Admin Instructions for 5 days 500mg on day 1 followed by 250mg on days 2 - 5  Dispense: 6 tablet; Refill: 0    2. Mild intermittent asthma without complication  - albuterol (PROVENTIL) (2.5 MG/3ML) 0.083% nebulizer solution; Take 3 mLs by nebulization every 6 hours as needed for Wheezing  Dispense: 25 each; Refill: 0  - albuterol sulfate HFA (VENTOLIN HFA) 108 (90 Base) MCG/ACT inhaler; Inhale 2 puffs into the lungs 4 times daily as needed for Wheezing  Dispense: 1 each; Refill: 1    3. Medication refill  - atorvastatin (LIPITOR) 40 MG tablet; Take one tablet by mouth every day. Dispense: 90 tablet; Refill: 0  - omeprazole (PRILOSEC) 40 MG delayed release capsule; TAKE 1 CAPSULE DAILY  Dispense: 90 capsule; Refill: 1  - norgestimate-ethinyl estradiol (ESTARYLLA) 0.25-35 MG-MCG per tablet; Take 1 tablet by mouth daily  Dispense: 3 packet; Refill: 3  - levothyroxine (SYNTHROID) 75 MCG tablet; TAKE 1 TABLET DAILY  Dispense: 90 tablet; Refill: 1    4. Hyperlipidemia, unspecified hyperlipidemia type  - atorvastatin (LIPITOR) 40 MG tablet; Take one tablet by mouth every day. Dispense: 90 tablet; Refill: 0  - Lipid Panel; Future    5.  Chronic GERD  - stable on medication  - omeprazole (PRILOSEC) 40 MG delayed release capsule; TAKE 1 CAPSULE DAILY  Dispense: 90 capsule; Refill: 1  - CBC with Auto Differential; Future    6. Menstrual cycle disorder  - norgestimate-ethinyl estradiol (ESTARYLLA) 0.25-35 MG-MCG per tablet; Take 1 tablet by mouth daily  Dispense: 3 packet; Refill: 3    7. Other specified hypothyroidism  - levothyroxine (SYNTHROID) 75 MCG tablet; TAKE 1 TABLET DAILY  Dispense: 90 tablet; Refill: 1  - CBC with Auto Differential; Future  - Comprehensive Metabolic Panel; Future  - TSH; Future  - FREE T4; Future    8. Hearing loss of right ear, unspecified hearing loss type  - External Referral To Audiology    Continue current medication regimen. Get fasting lab work done, will call results to you. Verbalized understanding and agreement with plan. Affirmation I spent at least 35 minutes of time reviewing patient's history, previous & current medical problems & all Labs + testing. This includes chart prep even prior to the visit. Various goals are discussed and multiple questions answered. Relevant counselling performed. No follow-ups on file. Care discussed with patient. Patient educated on signs and symptoms of exacerbation and when to seek further medical attention. Advised to call for any problems, questions, or concerns. Patient verbalizes understanding and agrees with plan. Medications reviewed and reconciled. Continue current medications. Appropriate prescriptions are ordered. Risks and benefits of meds are discussed. After visit summary provided.

## 2022-11-21 ASSESSMENT — ENCOUNTER SYMPTOMS
WHEEZING: 0
COUGH: 0
GASTROINTESTINAL NEGATIVE: 1
DIARRHEA: 0

## 2022-11-22 ENCOUNTER — NURSE ONLY (OUTPATIENT)
Dept: FAMILY MEDICINE CLINIC | Age: 42
End: 2022-11-22

## 2022-11-22 DIAGNOSIS — E78.5 HYPERLIPIDEMIA, UNSPECIFIED HYPERLIPIDEMIA TYPE: ICD-10-CM

## 2022-11-22 DIAGNOSIS — E03.8 OTHER SPECIFIED HYPOTHYROIDISM: ICD-10-CM

## 2022-11-22 DIAGNOSIS — K21.9 CHRONIC GERD: ICD-10-CM

## 2022-11-23 LAB
A/G RATIO: 1.4 (ref 1.1–2.2)
ALBUMIN SERPL-MCNC: 4.1 G/DL (ref 3.4–5)
ALP BLD-CCNC: 69 U/L (ref 40–129)
ALT SERPL-CCNC: 22 U/L (ref 10–40)
ANION GAP SERPL CALCULATED.3IONS-SCNC: 15 MMOL/L (ref 3–16)
AST SERPL-CCNC: 25 U/L (ref 15–37)
BASOPHILS ABSOLUTE: 0 K/UL (ref 0–0.2)
BASOPHILS RELATIVE PERCENT: 0.7 %
BILIRUB SERPL-MCNC: 0.3 MG/DL (ref 0–1)
BUN BLDV-MCNC: 13 MG/DL (ref 7–20)
CALCIUM SERPL-MCNC: 9.6 MG/DL (ref 8.3–10.6)
CHLORIDE BLD-SCNC: 96 MMOL/L (ref 99–110)
CHOLESTEROL, TOTAL: 148 MG/DL (ref 0–199)
CO2: 26 MMOL/L (ref 21–32)
CREAT SERPL-MCNC: 0.8 MG/DL (ref 0.6–1.1)
EOSINOPHILS ABSOLUTE: 0.1 K/UL (ref 0–0.6)
EOSINOPHILS RELATIVE PERCENT: 1.6 %
GFR SERPL CREATININE-BSD FRML MDRD: >60 ML/MIN/{1.73_M2}
GLUCOSE BLD-MCNC: 89 MG/DL (ref 70–99)
HCT VFR BLD CALC: 38.8 % (ref 36–48)
HDLC SERPL-MCNC: 47 MG/DL (ref 40–60)
HEMOGLOBIN: 12.5 G/DL (ref 12–16)
LDL CHOLESTEROL CALCULATED: 69 MG/DL
LYMPHOCYTES ABSOLUTE: 2 K/UL (ref 1–5.1)
LYMPHOCYTES RELATIVE PERCENT: 29.5 %
MCH RBC QN AUTO: 30.6 PG (ref 26–34)
MCHC RBC AUTO-ENTMCNC: 32.3 G/DL (ref 31–36)
MCV RBC AUTO: 94.8 FL (ref 80–100)
MONOCYTES ABSOLUTE: 0.5 K/UL (ref 0–1.3)
MONOCYTES RELATIVE PERCENT: 7.4 %
NEUTROPHILS ABSOLUTE: 4.1 K/UL (ref 1.7–7.7)
NEUTROPHILS RELATIVE PERCENT: 60.8 %
PDW BLD-RTO: 13.1 % (ref 12.4–15.4)
PLATELET # BLD: 378 K/UL (ref 135–450)
PMV BLD AUTO: 7.5 FL (ref 5–10.5)
POTASSIUM SERPL-SCNC: 4 MMOL/L (ref 3.5–5.1)
RBC # BLD: 4.09 M/UL (ref 4–5.2)
SODIUM BLD-SCNC: 137 MMOL/L (ref 136–145)
T4 FREE: 1.1 NG/DL (ref 0.9–1.8)
TOTAL PROTEIN: 7 G/DL (ref 6.4–8.2)
TRIGL SERPL-MCNC: 160 MG/DL (ref 0–150)
TSH SERPL DL<=0.05 MIU/L-ACNC: 3.06 UIU/ML (ref 0.27–4.2)
VLDLC SERPL CALC-MCNC: 32 MG/DL
WBC # BLD: 6.8 K/UL (ref 4–11)

## 2022-12-05 DIAGNOSIS — J32.8 OTHER CHRONIC SINUSITIS: ICD-10-CM

## 2022-12-05 DIAGNOSIS — J30.9 ALLERGIC RHINITIS, UNSPECIFIED SEASONALITY, UNSPECIFIED TRIGGER: ICD-10-CM

## 2022-12-05 DIAGNOSIS — H91.91 HEARING LOSS OF RIGHT EAR, UNSPECIFIED HEARING LOSS TYPE: Primary | ICD-10-CM

## 2022-12-22 ENCOUNTER — HOSPITAL ENCOUNTER (OUTPATIENT)
Dept: SLEEP CENTER | Age: 42
Discharge: HOME | End: 2022-12-22
Payer: COMMERCIAL

## 2022-12-22 DIAGNOSIS — G47.10 HYPERSOMNIA: ICD-10-CM

## 2022-12-22 DIAGNOSIS — G47.33 OSA (OBSTRUCTIVE SLEEP APNEA): ICD-10-CM

## 2022-12-22 DIAGNOSIS — E66.01 MORBID OBESITY WITH BMI OF 40.0-44.9, ADULT (HCC): ICD-10-CM

## 2022-12-22 PROCEDURE — 99214 OFFICE O/P EST MOD 30 MIN: CPT | Performed by: INTERNAL MEDICINE

## 2022-12-22 RX ORDER — MONTELUKAST SODIUM 10 MG/1
TABLET ORAL
COMMUNITY
Start: 2022-12-12

## 2022-12-22 RX ORDER — AZELASTINE 1 MG/ML
SPRAY, METERED NASAL
COMMUNITY
Start: 2022-12-14

## 2022-12-22 ASSESSMENT — ENCOUNTER SYMPTOMS
SHORTNESS OF BREATH: 0
BACK PAIN: 0
ABDOMINAL DISTENTION: 0
EYE ITCHING: 0
COUGH: 0
ABDOMINAL PAIN: 0
EYE DISCHARGE: 0

## 2022-12-22 NOTE — PROGRESS NOTES
[address]  1980  Referring Provider: ELIEL Novoa - ALEN    Subjective:     Chief Complaint   Patient presents with    3 Month Follow-Up    Sleep Apnea     G47.33       HPI  Mike Toledo is a 40 y.o. female has come back as a follow up. She has mild LATASHA with EDS. She is on a CPAP of 6 cm h20 which she is using it every night about 6 to 8 hours. She says that it is helping her. She has no loss of weight. She is not sleepy or tired during the day time. She has a FFM. Her 2 week download data showed a residual AHI is 0.5 and leak is 9.6 L/min. Current Outpatient Medications   Medication Sig Dispense Refill    montelukast (SINGULAIR) 10 MG tablet TAKE 1 TABLET BY MOUTH EVERY DAY      azelastine (ASTELIN) 0.1 % nasal spray INSTILL 1-2 PUFFS IN EACH NOSTRIL TWICE A DAY      albuterol (PROVENTIL) (2.5 MG/3ML) 0.083% nebulizer solution Take 3 mLs by nebulization every 6 hours as needed for Wheezing 25 each 0    albuterol sulfate HFA (VENTOLIN HFA) 108 (90 Base) MCG/ACT inhaler Inhale 2 puffs into the lungs 4 times daily as needed for Wheezing 1 each 1    atorvastatin (LIPITOR) 40 MG tablet Take one tablet by mouth every day. 90 tablet 0    omeprazole (PRILOSEC) 40 MG delayed release capsule TAKE 1 CAPSULE DAILY 90 capsule 1    norgestimate-ethinyl estradiol (ESTARYLLA) 0.25-35 MG-MCG per tablet Take 1 tablet by mouth daily 3 packet 3    levothyroxine (SYNTHROID) 75 MCG tablet TAKE 1 TABLET DAILY 90 tablet 1    escitalopram (LEXAPRO) 10 MG tablet       EVENING PRIMROSE OIL PO Take by mouth      melatonin 3 MG TABS tablet Take 3 mg by mouth daily      busPIRone (BUSPAR) 10 MG tablet TAKE 1 TABLET BY MOUTH TWO TIMES A DAY AS NEEDED      SUMAtriptan (IMITREX) 50 MG tablet TAKE 1 TABLET BY MOUTH AT ONSET OF MIGRAINE, MAY REPEAT DOSE AFTER 2 HOURS IF NEEDED.  NOT TO EXCEED 200 MG IN 24 HOURS, AS DIRECTED.      escitalopram (LEXAPRO) 20 MG tablet TAKE 1 TABLET BY MOUTH EVERY DAY      tiZANidine (ZANAFLEX) 2 MG tablet Take 1 tablet by mouth nightly      cetirizine (ZYRTEC) 10 MG tablet Take 10 mg by mouth 4 times daily as needed       Calcium Citrate-Vitamin D 200-250 MG-UNIT TABS Take 2 tablets by mouth daily      Multiple Vitamin (MULTI VITAMIN DAILY PO) Take 2 tablets by mouth daily      BIOTIN PO Take 1 tablet by mouth daily      fluticasone (FLONASE) 50 MCG/ACT nasal spray 1 spray by Nasal route 2 times daily      ferrous sulfate 325 (65 FE) MG tablet Take 28 mg by mouth daily (with breakfast) (Patient not taking: No sig reported)       No current facility-administered medications for this encounter.        Allergies   Allergen Reactions    Latex Itching    Dust Mite Extract     Molds & Smuts     Nicotine Other (See Comments)     Being around it can cause Nicotine      Oxybutynin Other (See Comments)     Constipation/drymouth     Pcn [Penicillins] Other (See Comments)     avoids    Tape Ciara Karo Tape] Rash       Past Medical History:   Diagnosis Date    Acne     Allergic rhinitis     Asthma     Depressive disorder     Gout     Hypothyroid     Morbid obesity with BMI of 45.0-49.9, adult [facility])        Past Surgical History:   Procedure Laterality Date    LASIK  2007    dr fish    SLEEVE GASTRECTOMY  4/25/16    LAPAROSCOPIC SLEEVE GASTRECTOMY - ETHICON    WISDOM TOOTH EXTRACTION  2000       Social History     Socioeconomic History    Marital status:    Occupational History    Occupation: rehab nurse    Tobacco Use    Smoking status: Never    Smokeless tobacco: Never   Vaping Use    Vaping Use: Never used   Substance and Sexual Activity    Alcohol use: No    Drug use: No    Sexual activity: Never     Social Determinants of Health     Financial Resource Strain: Low Risk     Difficulty of Paying Living Expenses: Not hard at all   Food Insecurity: No Food Insecurity    Worried About Running Out of Food in the Last Year: Never true    Ran Out of Food in the Last Year: Never true       Review of Systems   Constitutional: Negative for fatigue. HENT:  Negative for congestion and postnasal drip. Eyes:  Negative for discharge and itching. Respiratory:  Negative for cough and shortness of breath. Cardiovascular:  Negative for chest pain and leg swelling. Gastrointestinal:  Negative for abdominal distention and abdominal pain. Endocrine: Negative for cold intolerance and heat intolerance. Genitourinary:  Negative for enuresis and frequency. Musculoskeletal:  Negative for arthralgias and back pain. Allergic/Immunologic: Negative for environmental allergies and food allergies. Neurological:  Negative for light-headedness and headaches. Hematological:  Negative for adenopathy. Psychiatric/Behavioral:  Negative for agitation and behavioral problems. Objective: There were no vitals taken for this visit. There is no height or weight on file to calculate BMI. Sleep Medicine 10/28/2022 8/25/2022   Sitting and reading 0 0   Watching TV 3 3   Sitting, inactive in a public place (e.g. a theatre or a meeting) 2 2   As a passenger in a car for an hour without a break 3 3   Lying down to rest in the afternoon when circumstances permit 3 3   Sitting and talking to someone 0 0   Sitting quietly after a lunch without alcohol 3 3   In a car, while stopped for a few minutes in traffic 0 0   [place] Sleepiness Score 14 14   Neck circumference (Inches) 15.25 15.25     Mallampati 3    Physical Exam  Vitals reviewed. Constitutional:       Appearance: Normal appearance. HENT:      Head: Normocephalic and atraumatic. Nose: Nose normal.      Mouth/Throat:      Mouth: Mucous membranes are moist.   Eyes:      Extraocular Movements: Extraocular movements intact. Pupils: Pupils are equal, round, and reactive to light. Cardiovascular:      Rate and Rhythm: Normal rate and regular rhythm. Pulses: Normal pulses. Heart sounds: Normal heart sounds.    Pulmonary:      Effort: Pulmonary effort is normal.      Breath sounds: Normal breath sounds. Abdominal:      General: Abdomen is flat. Palpations: Abdomen is soft. Musculoskeletal:         General: Normal range of motion. Cervical back: Normal range of motion and neck supple. Skin:     General: Skin is warm and dry. Neurological:      General: No focal deficit present. Mental Status: She is alert and oriented to person, place, and time. Psychiatric:         Mood and Affect: Mood normal.         Behavior: Behavior normal.       Radiology: None    Assessment and Plan     Problem List          Respiratory    LATASHA (obstructive sleep apnea)      Advised to be compliant with the CPAP  Loose weight            Other    Hypersomnia       Advised to be compliant with the CPAP  Loose weight           Morbid obesity with BMI of 40.0-44.9, adult (HCC)      Advised to loose weight with diet and exercise                   Follow-Up:    No follow-ups on file.      Progress notes sent to the referring Provider    Florecita Rizo MD MD  12/22/2022  11:25 AM

## 2023-03-20 ENCOUNTER — HOSPITAL ENCOUNTER (OUTPATIENT)
Dept: MAMMOGRAPHY | Age: 43
Discharge: HOME | End: 2023-03-20
Payer: COMMERCIAL

## 2023-03-20 DIAGNOSIS — Z12.31 SCREENING MAMMOGRAM, ENCOUNTER FOR: ICD-10-CM

## 2023-03-20 PROCEDURE — 77063 BREAST TOMOSYNTHESIS BI: CPT

## 2023-03-27 ENCOUNTER — HOSPITAL ENCOUNTER (OUTPATIENT)
Dept: ULTRASOUND IMAGING | Age: 43
End: 2023-03-27
Payer: COMMERCIAL

## 2023-03-27 ENCOUNTER — HOSPITAL ENCOUNTER (OUTPATIENT)
Dept: WOMENS IMAGING | Age: 43
Discharge: HOME | End: 2023-03-27
Payer: COMMERCIAL

## 2023-03-27 DIAGNOSIS — R92.8 ABNORMAL MAMMOGRAM: ICD-10-CM

## 2023-03-27 PROCEDURE — G0279 TOMOSYNTHESIS, MAMMO: HCPCS

## 2023-05-01 ENCOUNTER — OFFICE VISIT (OUTPATIENT)
Dept: FAMILY MEDICINE CLINIC | Age: 43
End: 2023-05-01
Payer: COMMERCIAL

## 2023-05-01 VITALS
DIASTOLIC BLOOD PRESSURE: 78 MMHG | SYSTOLIC BLOOD PRESSURE: 122 MMHG | HEIGHT: 63 IN | HEART RATE: 77 BPM | OXYGEN SATURATION: 98 % | TEMPERATURE: 97.9 F | BODY MASS INDEX: 43.59 KG/M2 | WEIGHT: 246 LBS

## 2023-05-01 DIAGNOSIS — F41.9 ANXIETY: ICD-10-CM

## 2023-05-01 DIAGNOSIS — F32.A DEPRESSIVE DISORDER: Primary | ICD-10-CM

## 2023-05-01 DIAGNOSIS — F41.8 SITUATIONAL ANXIETY: ICD-10-CM

## 2023-05-01 PROCEDURE — G8427 DOCREV CUR MEDS BY ELIG CLIN: HCPCS | Performed by: NURSE PRACTITIONER

## 2023-05-01 PROCEDURE — 1036F TOBACCO NON-USER: CPT | Performed by: NURSE PRACTITIONER

## 2023-05-01 PROCEDURE — 99214 OFFICE O/P EST MOD 30 MIN: CPT | Performed by: NURSE PRACTITIONER

## 2023-05-01 PROCEDURE — G8417 CALC BMI ABV UP PARAM F/U: HCPCS | Performed by: NURSE PRACTITIONER

## 2023-05-01 RX ORDER — ESCITALOPRAM OXALATE 20 MG/1
20 TABLET ORAL DAILY
Qty: 30 TABLET | Refills: 0 | Status: SHIPPED | OUTPATIENT
Start: 2023-05-01

## 2023-05-01 RX ORDER — BUSPIRONE HYDROCHLORIDE 15 MG/1
15 TABLET ORAL 3 TIMES DAILY
Qty: 60 TABLET | Refills: 0 | Status: SHIPPED | OUTPATIENT
Start: 2023-05-01

## 2023-05-01 SDOH — ECONOMIC STABILITY: INCOME INSECURITY: HOW HARD IS IT FOR YOU TO PAY FOR THE VERY BASICS LIKE FOOD, HOUSING, MEDICAL CARE, AND HEATING?: NOT HARD AT ALL

## 2023-05-01 SDOH — ECONOMIC STABILITY: FOOD INSECURITY: WITHIN THE PAST 12 MONTHS, YOU WORRIED THAT YOUR FOOD WOULD RUN OUT BEFORE YOU GOT MONEY TO BUY MORE.: NEVER TRUE

## 2023-05-01 SDOH — ECONOMIC STABILITY: HOUSING INSECURITY
IN THE LAST 12 MONTHS, WAS THERE A TIME WHEN YOU DID NOT HAVE A STEADY PLACE TO SLEEP OR SLEPT IN A SHELTER (INCLUDING NOW)?: NO

## 2023-05-01 SDOH — ECONOMIC STABILITY: FOOD INSECURITY: WITHIN THE PAST 12 MONTHS, THE FOOD YOU BOUGHT JUST DIDN'T LAST AND YOU DIDN'T HAVE MONEY TO GET MORE.: NEVER TRUE

## 2023-05-01 ASSESSMENT — PATIENT HEALTH QUESTIONNAIRE - PHQ9
SUM OF ALL RESPONSES TO PHQ QUESTIONS 1-9: 18
1. LITTLE INTEREST OR PLEASURE IN DOING THINGS: 1
5. POOR APPETITE OR OVEREATING: 3
6. FEELING BAD ABOUT YOURSELF - OR THAT YOU ARE A FAILURE OR HAVE LET YOURSELF OR YOUR FAMILY DOWN: 2
10. IF YOU CHECKED OFF ANY PROBLEMS, HOW DIFFICULT HAVE THESE PROBLEMS MADE IT FOR YOU TO DO YOUR WORK, TAKE CARE OF THINGS AT HOME, OR GET ALONG WITH OTHER PEOPLE: 2
SUM OF ALL RESPONSES TO PHQ9 QUESTIONS 1 & 2: 2
4. FEELING TIRED OR HAVING LITTLE ENERGY: 3
3. TROUBLE FALLING OR STAYING ASLEEP: 3
7. TROUBLE CONCENTRATING ON THINGS, SUCH AS READING THE NEWSPAPER OR WATCHING TELEVISION: 3
SUM OF ALL RESPONSES TO PHQ QUESTIONS 1-9: 18
8. MOVING OR SPEAKING SO SLOWLY THAT OTHER PEOPLE COULD HAVE NOTICED. OR THE OPPOSITE, BEING SO FIGETY OR RESTLESS THAT YOU HAVE BEEN MOVING AROUND A LOT MORE THAN USUAL: 2
2. FEELING DOWN, DEPRESSED OR HOPELESS: 1
SUM OF ALL RESPONSES TO PHQ QUESTIONS 1-9: 18
9. THOUGHTS THAT YOU WOULD BE BETTER OFF DEAD, OR OF HURTING YOURSELF: 0
SUM OF ALL RESPONSES TO PHQ QUESTIONS 1-9: 18

## 2023-05-02 ASSESSMENT — ENCOUNTER SYMPTOMS
SHORTNESS OF BREATH: 0
WHEEZING: 0
COUGH: 0
CHEST TIGHTNESS: 0

## 2023-05-21 DIAGNOSIS — K21.9 CHRONIC GERD: ICD-10-CM

## 2023-05-21 DIAGNOSIS — Z76.0 MEDICATION REFILL: ICD-10-CM

## 2023-05-22 RX ORDER — OMEPRAZOLE 40 MG/1
CAPSULE, DELAYED RELEASE ORAL
Qty: 90 CAPSULE | Refills: 0 | Status: SHIPPED | OUTPATIENT
Start: 2023-05-22

## 2023-06-20 DIAGNOSIS — E03.8 OTHER SPECIFIED HYPOTHYROIDISM: ICD-10-CM

## 2023-06-20 DIAGNOSIS — Z76.0 MEDICATION REFILL: ICD-10-CM

## 2023-06-20 RX ORDER — LEVOTHYROXINE SODIUM 0.07 MG/1
TABLET ORAL
Qty: 90 TABLET | Refills: 0 | Status: SHIPPED | OUTPATIENT
Start: 2023-06-20

## 2023-08-14 DIAGNOSIS — E78.5 HYPERLIPIDEMIA, UNSPECIFIED HYPERLIPIDEMIA TYPE: ICD-10-CM

## 2023-08-14 DIAGNOSIS — K21.9 CHRONIC GERD: ICD-10-CM

## 2023-08-14 DIAGNOSIS — Z76.0 MEDICATION REFILL: ICD-10-CM

## 2023-08-14 RX ORDER — ATORVASTATIN CALCIUM 40 MG/1
TABLET, FILM COATED ORAL
Qty: 90 TABLET | Refills: 0 | Status: SHIPPED | OUTPATIENT
Start: 2023-08-14

## 2023-08-14 RX ORDER — OMEPRAZOLE 40 MG/1
CAPSULE, DELAYED RELEASE ORAL
Qty: 90 CAPSULE | Refills: 0 | Status: SHIPPED | OUTPATIENT
Start: 2023-08-14

## 2023-09-12 DIAGNOSIS — F32.A DEPRESSIVE DISORDER: ICD-10-CM

## 2023-09-12 RX ORDER — ESCITALOPRAM OXALATE 10 MG/1
10 TABLET ORAL AS NEEDED
Qty: 30 TABLET | Refills: 1 | Status: SHIPPED | OUTPATIENT
Start: 2023-09-12

## 2023-09-12 RX ORDER — ESCITALOPRAM OXALATE 20 MG/1
20 TABLET ORAL DAILY
Qty: 30 TABLET | Refills: 0 | Status: SHIPPED | OUTPATIENT
Start: 2023-09-12

## 2023-10-12 NOTE — PROGRESS NOTES
Detail Level: Detailed albuterol (PROVENTIL) (2.5 MG/3ML) 0.083% nebulizer solution Take 3 mLs by nebulization every 6 hours as needed for Wheezing  ELIEL Mccrary CNP   levothyroxine (SYNTHROID) 75 MCG tablet TAKE 1 TABLET DAILY  ELIEL Mccrary CNP   norgestimate-ethinyl estradiol (ESTARYLLA) 0.25-35 MG-MCG per tablet Take 1 tablet by mouth daily  ELIEL Mccrary CNP   Deryl Chum 150 MG/ML SOSY injection   Historical Provider, MD   cetirizine (ZYRTEC) 10 MG tablet Take 10 mg by mouth 4 times daily as needed   Historical Provider, MD   ferrous sulfate 325 (65 FE) MG tablet Take 28 mg by mouth daily (with breakfast)  Historical Provider, MD   Calcium Citrate-Vitamin D (CALCIUM CITRATE+D3 PETITES) 200-250 MG-UNIT TABS Take 2 tablets by mouth daily  Historical Provider, MD   Multiple Vitamin (MULTI VITAMIN DAILY PO) Take 2 tablets by mouth daily  Historical Provider, MD   BIOTIN PO Take 1 tablet by mouth daily  Historical Provider, MD   fluticasone (FLONASE) 50 MCG/ACT nasal spray 1 spray by Nasal route 2 times daily  Historical Provider, MD       Social History     Tobacco Use    Smoking status: Never Smoker    Smokeless tobacco: Never Used   Substance Use Topics    Alcohol use: No    Drug use: No          PHYSICAL EXAMINATION:  [ INSTRUCTIONS:  \"[x]\" Indicates a positive item  \"[]\" Indicates a negative item  -- DELETE ALL ITEMS NOT EXAMINED]  Vital Signs: (As obtained by patient/caregiver or practitioner observation)    Blood pressure-  Heart rate-    Respiratory rate-    Temperature-  Pulse oximetry-     Constitutional: [x] Appears well-developed and well-nourished [x] No apparent distress      [] Abnormal-   Mental status  [x] Alert and awake  [x] Oriented to person/place/time [x]Able to follow commands      Eyes:  EOM    []  Normal  [] Abnormal-  Sclera  [x]  Normal  [] Abnormal -         Discharge [x]  None visible  [] Abnormal -    HENT:   [x] Normocephalic, atraumatic.   [] Abnormal [] Mouth/Throat: Mucous membranes are moist. Unable to visualize throat    External Ears [x] Normal  [] Abnormal-     Neck: [x] No visualized mass     Pulmonary/Chest: [x] Respiratory effort normal.  [x] No visualized signs of difficulty breathing or respiratory distress        [] Abnormal-      Musculoskeletal:   [] Normal gait with no signs of ataxia         [x] Normal range of motion of neck        [] Abnormal-       Neurological:        [x] No Facial Asymmetry (Cranial nerve 7 motor function) (limited exam to video visit)          [] No gaze palsy        [] Abnormal-         Skin:        [x] No significant exanthematous lesions or discoloration noted on facial skin         [] Abnormal-            Psychiatric:       [x] Normal Affect [] No Hallucinations        [] Abnormal-     Other pertinent observable physical exam findings-     ASSESSMENT/PLAN:  1. Acute viral pharyngitis  - azithromycin (ZITHROMAX) 250 MG tablet; Take 1 tablet by mouth See Admin Instructions for 5 days 500mg on day 1 followed by 250mg on days 2 - 5  Dispense: 6 tablet; Refill: 0    2. Exposure to strep throat  - azithromycin (ZITHROMAX) 250 MG tablet; Take 1 tablet by mouth See Admin Instructions for 5 days 500mg on day 1 followed by 250mg on days 2 - 5  Dispense: 6 tablet; Refill: 0    Fluids, rest  Take prescribed medication as directed  Warm salt gargles  Use tylenol as needed discomfort    No follow-ups on file. Gonzalo Farris is a 45 y.o. female being evaluated by a Virtual Visit (video visit) encounter to address concerns as mentioned above. A caregiver was present when appropriate. Due to this being a TeleHealth encounter (During HGXES-13 public health emergency), evaluation of the following organ systems was limited: Vitals/Constitutional/EENT/Resp/CV/GI//MS/Neuro/Skin/Heme-Lymph-Imm. Pursuant to the emergency declaration under the [address] [place] [address] and the [employer] and [employer] Act, this Virtual Visit was conducted with patient's (and/or legal guardian's) consent, to reduce the patient's risk of exposure to COVID-19 and provide necessary medical care. The patient (and/or legal guardian) has also been advised to contact this office for worsening conditions or problems, and seek emergency medical treatment and/or call 911 if deemed necessary. Patient identification was verified at the start of the visit: Yes    Total time spent on this encounter: Not billed by time    Services were provided through a video synchronous discussion virtually to substitute for in-person clinic visit. Patient and provider were located at their individual homes. --ELIEL Francois - CNP on 2/1/2021 at 9:30 AM    An electronic signature was used to authenticate this note. Detail Level: Generalized

## 2023-11-08 DIAGNOSIS — F32.A DEPRESSIVE DISORDER: ICD-10-CM

## 2023-11-08 DIAGNOSIS — Z76.0 MEDICATION REFILL: ICD-10-CM

## 2023-11-08 DIAGNOSIS — E03.8 OTHER SPECIFIED HYPOTHYROIDISM: ICD-10-CM

## 2023-11-08 RX ORDER — LEVOTHYROXINE SODIUM 0.07 MG/1
TABLET ORAL
Qty: 90 TABLET | Refills: 1 | Status: SHIPPED | OUTPATIENT
Start: 2023-11-08

## 2023-11-08 RX ORDER — ESCITALOPRAM OXALATE 20 MG/1
20 TABLET ORAL DAILY
Qty: 30 TABLET | Refills: 3 | Status: SHIPPED | OUTPATIENT
Start: 2023-11-08

## 2023-11-08 RX ORDER — ESCITALOPRAM OXALATE 10 MG/1
TABLET ORAL
Qty: 30 TABLET | Refills: 3 | Status: SHIPPED | OUTPATIENT
Start: 2023-11-08